# Patient Record
Sex: FEMALE | Race: WHITE | Employment: FULL TIME | URBAN - METROPOLITAN AREA
[De-identification: names, ages, dates, MRNs, and addresses within clinical notes are randomized per-mention and may not be internally consistent; named-entity substitution may affect disease eponyms.]

---

## 2022-06-23 ENCOUNTER — OFFICE VISIT (OUTPATIENT)
Dept: INTERNAL MEDICINE CLINIC | Age: 42
End: 2022-06-23
Payer: COMMERCIAL

## 2022-06-23 VITALS
DIASTOLIC BLOOD PRESSURE: 100 MMHG | WEIGHT: 288 LBS | HEART RATE: 98 BPM | TEMPERATURE: 99.3 F | SYSTOLIC BLOOD PRESSURE: 165 MMHG | BODY MASS INDEX: 45.2 KG/M2 | OXYGEN SATURATION: 96 % | HEIGHT: 67 IN

## 2022-06-23 DIAGNOSIS — Z00.00 LABORATORY EXAM ORDERED AS PART OF ROUTINE GENERAL MEDICAL EXAMINATION: ICD-10-CM

## 2022-06-23 DIAGNOSIS — E78.5 HYPERLIPIDEMIA, UNSPECIFIED HYPERLIPIDEMIA TYPE: ICD-10-CM

## 2022-06-23 DIAGNOSIS — R63.1 POLYDIPSIA: ICD-10-CM

## 2022-06-23 DIAGNOSIS — K62.5 RECTAL BLEEDING: ICD-10-CM

## 2022-06-23 DIAGNOSIS — Z76.89 ENCOUNTER TO ESTABLISH CARE: ICD-10-CM

## 2022-06-23 DIAGNOSIS — R35.89 POLYURIA: ICD-10-CM

## 2022-06-23 DIAGNOSIS — N89.8 VAGINA ITCHING: ICD-10-CM

## 2022-06-23 DIAGNOSIS — Z11.59 NEED FOR HEPATITIS C SCREENING TEST: ICD-10-CM

## 2022-06-23 DIAGNOSIS — Z00.00 PHYSICAL EXAM: Primary | ICD-10-CM

## 2022-06-23 DIAGNOSIS — K59.00 CONSTIPATION, UNSPECIFIED CONSTIPATION TYPE: ICD-10-CM

## 2022-06-23 DIAGNOSIS — I10 ESSENTIAL HYPERTENSION: ICD-10-CM

## 2022-06-23 PROCEDURE — 99386 PREV VISIT NEW AGE 40-64: CPT | Performed by: PHYSICIAN ASSISTANT

## 2022-06-23 PROCEDURE — 99204 OFFICE O/P NEW MOD 45 MIN: CPT | Performed by: PHYSICIAN ASSISTANT

## 2022-06-23 RX ORDER — VALSARTAN 160 MG/1
160 TABLET ORAL DAILY
Qty: 90 TABLET | Refills: 1 | Status: SHIPPED | OUTPATIENT
Start: 2022-06-23

## 2022-06-23 NOTE — PROGRESS NOTES
Chief Complaint   Patient presents with    Establish Care     Visit Vitals  BP (!) 165/100 (BP 1 Location: Right arm, BP Patient Position: Sitting, BP Cuff Size: Large adult)   Pulse 98   Temp 99.3 °F (37.4 °C) (Oral)   Ht 5' 7\" (1.702 m)   Wt 288 lb (130.6 kg)   SpO2 96%   BMI 45.11 kg/m²       1. \"Have you been to the ER, urgent care clinic since your last visit? Hospitalized since your last visit? \" No    2. \"Have you seen or consulted any other health care providers outside of the [address] [place] [address] since your last visit? \" No     3. For patients aged 39-70: Has the patient had a colonoscopy / FIT/ Cologuard? No      If the patient is female:    4. For patients aged 41-77: Has the patient had a mammogram within the past 2 years? No      5. For patients aged 21-65: Has the patient had a pap smear?  Yes - no Care Gap present done in 2020

## 2022-06-23 NOTE — PATIENT INSTRUCTIONS
Well Visit, Ages 25 to 48: Care Instructions  Overview     Well visits can help you stay healthy. Your doctor has checked your overall health and may have suggested ways to take good care of yourself. Your doctor also may have recommended tests. At home, you can help prevent illness with healthy eating, regular exercise, and other steps. Follow-up care is a key part of your treatment and safety. Be sure to make and go to all appointments, and call your doctor if you are having problems. It's also a good idea to know your test results and keep a list of the medicines you take. How can you care for yourself at home? · Get screening tests that you and your doctor decide on. Screening helps find diseases before any symptoms appear. · Eat healthy foods. Choose fruits, vegetables, whole grains, protein, and low-fat dairy foods. Limit fat, especially saturated fat. Reduce salt in your diet. · Limit alcohol. If you are a man, have no more than 2 drinks a day or 14 drinks a week. If you are a woman, have no more than 1 drink a day or 7 drinks a week. · Get at least 30 minutes of physical activity on most days of the week. Walking is a good choice. You also may want to do other activities, such as running, swimming, cycling, or playing tennis or team sports. Discuss any changes in your exercise program with your doctor. · Reach and stay at a healthy weight. This will lower your risk for many problems, such as obesity, diabetes, heart disease, and high blood pressure. · Do not smoke or allow others to smoke around you. If you need help quitting, talk to your doctor about stop-smoking programs and medicines. These can increase your chances of quitting for good. · Care for your mental health. It is easy to get weighed down by worry and stress. Learn strategies to manage stress, like deep breathing and mindfulness, and stay connected with your family and community.  If you find you often feel sad or hopeless, talk with your doctor. Treatment can help. · Talk to your doctor about whether you have any risk factors for sexually transmitted infections (STIs). You can help prevent STIs if you wait to have sex with a new partner (or partners) until you've each been tested for STIs. It also helps if you use condoms (male or female condoms) and if you limit your sex partners to one person who only has sex with you. Vaccines are available for some STIs, such as HPV. · Use birth control if it's important to you to prevent pregnancy. Talk with your doctor about the choices available and what might be best for you. · If you think you may have a problem with alcohol or drug use, talk to your doctor. This includes prescription medicines (such as amphetamines and opioids) and illegal drugs (such as cocaine and methamphetamine). Your doctor can help you figure out what type of treatment is best for you. · Protect your skin from too much sun. When you're outdoors from 10 a.m. to 4 p.m., stay in the shade or cover up with clothing and a hat with a wide brim. Wear sunglasses that block UV rays. Even when it's cloudy, put broad-spectrum sunscreen (SPF 30 or higher) on any exposed skin. · See a dentist one or two times a year for checkups and to have your teeth cleaned. · Wear a seat belt in the car. When should you call for help? Watch closely for changes in your health, and be sure to contact your doctor if you have any problems or symptoms that concern you. Where can you learn more? Go to http://www.[employer].com/  Enter P072 in the search box to learn more about \"Well Visit, Ages 25 to 48: Care Instructions. \"  Current as of: October 6, 2021               Content Version: 13.2  © 3394-1474 [employer], [employer]. Care instructions adapted under license by "[employer] (which disclaims liability or warranty for this information).  If you have questions about a medical condition or this instruction, always ask your healthcare professional. [place] [address] any warranty or liability for your use of this information.

## 2022-06-23 NOTE — PROGRESS NOTES
HPI:  39 y.o.  presents as new patient for wellness visit and to establish care. Her mother is also a patient of mine.      She previously saw [facility], but not since 2019  Born in [place], lived in [place] 2007 - 2015  She left [facility] in 2015, moved to [facility], and returned in 2017  She is  March 2020, no children  She works for a [employer]    S/p T&A 2019, and then has not seen regular doctor since the Covid pandemic    Diagnosed with VILMA while living in [facility] in 2016, started CPAP there, but developed recurring tonsiitis upon her return to the [facility], followed by ENT  She also had uvula removed and benign growth was on tonsil    Pap smear by Gyn in 2020  At [facility], Dr Akbar New  Had an odd, heavy period at that time 8/2020 and saw gyn 9/2020 and had negative US    H/O HTN and Hyperlipidemia  Reports her HTN and high CHL resolved when she lost weight, 100 lbs, in 2011  Does not recall names of meds she was on  Reports when on meds, her BP ran in the 140s    She last had labs done in 2018    No prior h/o DM, prediabetes  But she has felt thirsty, dehydrated, started drinking water with subsequent increased urination  No blurry vision  Her PGF had diabetes    No CP, ASHRAF    Reports changes in stool habits  Became constipated 4 months ago, normal daily BM for her but went 3-4 days without a BM,  had bright red blood once she had a BM after 3-4 days , and may occur intermittently since then  No change in diet or stress level at the time  No fam hx colon cancer  Her father has had benign colon polyps    During the last 4 mos she treated a vaginal dryness and itch with 3 different rounds of OTC yeast med    She has lost 20 lbs this year by her scale    Had Tdap in 2014 for trip to [place]    She took a weight loss medicine in 2017, a \"purple pill\" that made her dizzy and lightheaded      Patient Active Problem List    Diagnosis    Sleep apnea     diagnosed in [facility] 2016, repeat study after tonstilectomy showed improvement      Hypertension     came off meds in 2011 after losing 100 lbs      Hyperlipemia     came off meds in 2011 after losing 100 lbs           Past Medical History:   Diagnosis Date    Sleep apnea        Social History     Tobacco Use    Smoking status: Not on file    Smokeless tobacco: Never Used   Vaping Use    Vaping Use: Not on file   Substance Use Topics    Alcohol use: Yes     Comment: occasionally    Drug use: Never           No Known Allergies    ROS:  Review of Systems:  Gen: no fatigue, fever, chills, (+)intentional weight loss; no weight gain  Eyes: no excessive tearing, itching, or discharge  Nose: no rhinorrhea, no sinus pain  Mouth: no oral lesions, no sore throat  Resp: no shortness of breath, no wheezing, no cough  CV: no chest pain, no orthopnea, no paroxysmal nocturnal dyspnea, no lower extremity edema, no palpitations  Abd: (+)constipation, rectal bleeding; no nausea, no heartburn, no diarrhea, no abdominal pain  Neuro: no headaches, no syncope or presyncopal episodes  Endo: (+)polyuria, polydipsia  Heme: no lymphadenopathy, no easy bruising or bleeding      PE:  Visit Vitals  BP (!) 165/100 (BP 1 Location: Right arm, BP Patient Position: Sitting, BP Cuff Size: Large adult)   Pulse 98   Temp 99.3 °F (37.4 °C) (Oral)   Ht 5' 7\" (1.702 m)   Wt 288 lb (130.6 kg)   SpO2 96%   BMI 45.11 kg/m²     Gen: alert, oriented, no acute distress  Head: normocephalic, atraumatic  Ears: external auditory canals clear, TMs without erythema or effusion  Eyes: pupils equal round reactive to light, sclera clear, conjunctiva clear  Oral: moist mucus membranes, no oral lesions, no pharyngeal inflammation or exudate  Neck: symmetric normal sized thyroid, no carotid bruits, no jugular vein distention  Resp: no increase work of breathing, lungs clear to ausculation bilaterally, no wheezing, rales or rhonchi  CV: S1, S2 normal.  No murmurs, rubs, or gallops.     Abd: soft, not tender, not distended. No hepatosplenomegaly. Normal bowel sounds. Neuro: cranial nerves intact, normal strength and movement in all extremities, reflexes and sensation intact and symmetric. Skin: no lesion or rash  Extremities: no cyanosis or edema  Breast and pelvic deferred to Gyn    No results found for this visit on 06/23/22. Assessment/Plan:      ICD-10-CM ICD-9-CM    1. Physical exam  Z00.00 V70.9    2. Encounter to establish care  Z76.89 V65.8    3. Need for hepatitis C screening test  Z11.59 V73.89 HEPATITIS C AB   4. Laboratory exam ordered as part of routine general medical examination  C63.24 D00.25 METABOLIC PANEL, COMPREHENSIVE      CBC WITH AUTOMATED DIFF      HEMOGLOBIN A1C WITH EAG      LIPID PANEL      URINALYSIS W/ REFLEX CULTURE      TSH 3RD GENERATION      HEPATITIS C AB   5. Essential hypertension  I10 401.9 valsartan (DIOVAN) 160 mg tablet   6. Hyperlipidemia, unspecified hyperlipidemia type  E78.5 272.4 LIPID PANEL   7. Rectal bleeding  K62.5 569.3 OCCULT BLOOD IMMUNOASSAY,DIAGNOSTIC      REFERRAL TO GASTROENTEROLOGY   8. Constipation, unspecified constipation type  K59.00 564.00 REFERRAL TO GASTROENTEROLOGY   9. Vagina itching  N89.8 698.1 HEMOGLOBIN A1C WITH EAG      NUSWAB VAGINITIS PLUS (VG+) WITH CANDIDA (SIX SPECIES)   10. Polydipsia  R63.1 783.5 HEMOGLOBIN A1C WITH EAG   11. Polyuria  R35.89 788.42 HEMOGLOBIN A1C WITH EAG      URINALYSIS W/ REFLEX CULTURE       1. Annual physical exam  Pap and pelvic per GYN, will request records from Dr. Sandeep Lugo    2. Establishing care today    3. Routine hepatitis C screening    4. Routine labs ordered    5. Hypertension  History of same but came off medications with weight loss, has not been treated since 2011  Not controlled, blood pressure 165/100 today  New start valsartan 160 mg    6.   Hyperlipidemia  History of same but came off medication with weight loss and has not been treated since 2011  We will check fasting lipids to make further recommendations    7. Rectal bleeding  Has occurred in the setting of constipation over the last 4 months  No family history of colon cancer, but her father has colon polyps  We will check Hemoccult and also refer to gastroenterology for further recommendation  See #8    8. Constipation  Recommended stations given for diet and lifestyle  With changes in bowel habits and associated with rectal bleeding will refer to GI    9. Vaginal itching  She is done over-the-counter yeast treatment x3 without relief  See below, this could be sign of uncontrolled or new onset diabetes  Will check UA, new swab and diabetes screening    10/11. Polydipsia and polyuria  Concern for new onset diabetes, will check labs and make further recommendations  Will also monitor urine for UTI      Health Maintenance reviewed - updated.     Orders Placed This Encounter    METABOLIC PANEL, COMPREHENSIVE     Standing Status:   Future     Number of Occurrences:   1     Standing Expiration Date:   6/23/2023    CBC WITH AUTOMATED DIFF     Standing Status:   Future     Number of Occurrences:   1     Standing Expiration Date:   6/23/2023    HEMOGLOBIN A1C WITH EAG     Standing Status:   Future     Number of Occurrences:   1     Standing Expiration Date:   6/23/2023    LIPID PANEL     Standing Status:   Future     Number of Occurrences:   1     Standing Expiration Date:   6/23/2023    URINALYSIS W/ REFLEX CULTURE     Standing Status:   Future     Number of Occurrences:   1     Standing Expiration Date:   6/23/2023    TSH 3RD GENERATION     Standing Status:   Future     Number of Occurrences:   1     Standing Expiration Date:   6/23/2023    HEPATITIS C AB     Standing Status:   Future     Number of Occurrences:   1     Standing Expiration Date:   6/23/2023    OCCULT BLOOD IMMUNOASSAY,DIAGNOSTIC     Standing Status:   Future     Standing Expiration Date:   6/23/2023    NUSWAB VAGINITIS PLUS (VG+) WITH CANDIDA (SIX SPECIES)     Standing Status:   Future     Number of Occurrences:   1     Standing Expiration Date:   6/23/2023    [facility]     Referral Priority:   Routine     Referral Type:   Consultation     Referral Reason:   Specialty Services Required     Referred to Provider:   Tiffany Kwong MD     Number of Visits Requested:   1    valsartan (DIOVAN) 160 mg tablet     Sig: Take 1 Tablet by mouth daily. Dispense:  90 Tablet     Refill:  1       There are no discontinued medications. Recommended healthy diet low in carbohydrates, fats, sodium and cholesterol. Recommended regular cardiovascular exercise 3-6 times per week for 30-60 minutes daily. Verbal and written instructions (see AVS) provided. Patient expresses understanding of diagnosis and treatment plan. Follow-up and Dispositions    · Return in about 4 weeks (around 7/21/2022) for HTN, fasting labs soon.        Future Appointments   Date Time Provider Joseph Gonzalez   8/11/2022  8:00 AM Genoveva Hernández PA-C [facility]

## 2022-06-24 ENCOUNTER — APPOINTMENT (OUTPATIENT)
Dept: INTERNAL MEDICINE CLINIC | Age: 42
End: 2022-06-24

## 2022-06-24 DIAGNOSIS — E78.5 HYPERLIPIDEMIA, UNSPECIFIED HYPERLIPIDEMIA TYPE: ICD-10-CM

## 2022-06-24 DIAGNOSIS — R35.89 POLYURIA: ICD-10-CM

## 2022-06-24 DIAGNOSIS — Z11.59 NEED FOR HEPATITIS C SCREENING TEST: ICD-10-CM

## 2022-06-24 DIAGNOSIS — R63.1 POLYDIPSIA: ICD-10-CM

## 2022-06-24 DIAGNOSIS — N89.8 VAGINA ITCHING: ICD-10-CM

## 2022-06-24 DIAGNOSIS — Z00.00 LABORATORY EXAM ORDERED AS PART OF ROUTINE GENERAL MEDICAL EXAMINATION: ICD-10-CM

## 2022-06-24 LAB
LDLC SERPL DIRECT ASSAY-MCNC: 189 MG/DL (ref 0–100)
UR CULT HOLD, URHOLD: NORMAL

## 2022-06-25 LAB
ALBUMIN SERPL-MCNC: 4.1 G/DL (ref 3.5–5)
ALBUMIN/GLOB SERPL: 1.1 {RATIO} (ref 1.1–2.2)
ALP SERPL-CCNC: 135 U/L (ref 45–117)
ALT SERPL-CCNC: 197 U/L (ref 12–78)
ANION GAP SERPL CALC-SCNC: 8 MMOL/L (ref 5–15)
APPEARANCE UR: ABNORMAL
AST SERPL-CCNC: 121 U/L (ref 15–37)
BACTERIA SPEC CULT: NORMAL
BACTERIA URNS QL MICRO: ABNORMAL /HPF
BASOPHILS # BLD: 0 K/UL (ref 0–0.1)
BASOPHILS NFR BLD: 1 % (ref 0–1)
BILIRUB SERPL-MCNC: 0.4 MG/DL (ref 0.2–1)
BILIRUB UR QL: NEGATIVE
BUN SERPL-MCNC: 10 MG/DL (ref 6–20)
BUN/CREAT SERPL: 18 (ref 12–20)
CALCIUM SERPL-MCNC: 10.3 MG/DL (ref 8.5–10.1)
CHLORIDE SERPL-SCNC: 101 MMOL/L (ref 97–108)
CHOLEST SERPL-MCNC: 325 MG/DL
CO2 SERPL-SCNC: 27 MMOL/L (ref 21–32)
COLOR UR: ABNORMAL
CREAT SERPL-MCNC: 0.55 MG/DL (ref 0.55–1.02)
DIFFERENTIAL METHOD BLD: ABNORMAL
EOSINOPHIL # BLD: 0.1 K/UL (ref 0–0.4)
EOSINOPHIL NFR BLD: 2 % (ref 0–7)
EPITH CASTS URNS QL MICRO: ABNORMAL /LPF
ERYTHROCYTE [DISTWIDTH] IN BLOOD BY AUTOMATED COUNT: 13.3 % (ref 11.5–14.5)
EST. AVERAGE GLUCOSE BLD GHB EST-MCNC: 258 MG/DL
GLOBULIN SER CALC-MCNC: 3.6 G/DL (ref 2–4)
GLUCOSE SERPL-MCNC: 250 MG/DL (ref 65–100)
GLUCOSE UR STRIP.AUTO-MCNC: >1000 MG/DL
HBA1C MFR BLD: 10.6 % (ref 4–5.6)
HCT VFR BLD AUTO: 47.6 % (ref 35–47)
HCV AB SERPL QL IA: NONREACTIVE
HDLC SERPL-MCNC: 42 MG/DL
HDLC SERPL: 7.7 {RATIO} (ref 0–5)
HGB BLD-MCNC: 16.1 G/DL (ref 11.5–16)
HGB UR QL STRIP: ABNORMAL
IMM GRANULOCYTES # BLD AUTO: 0 K/UL (ref 0–0.04)
IMM GRANULOCYTES NFR BLD AUTO: 1 % (ref 0–0.5)
KETONES UR QL STRIP.AUTO: ABNORMAL MG/DL
LDLC SERPL CALC-MCNC: ABNORMAL MG/DL (ref 0–100)
LEUKOCYTE ESTERASE UR QL STRIP.AUTO: ABNORMAL
LYMPHOCYTES # BLD: 2.4 K/UL (ref 0.8–3.5)
LYMPHOCYTES NFR BLD: 30 % (ref 12–49)
MCH RBC QN AUTO: 29.3 PG (ref 26–34)
MCHC RBC AUTO-ENTMCNC: 33.8 G/DL (ref 30–36.5)
MCV RBC AUTO: 86.5 FL (ref 80–99)
MONOCYTES # BLD: 0.4 K/UL (ref 0–1)
MONOCYTES NFR BLD: 5 % (ref 5–13)
NEUTS SEG # BLD: 5 K/UL (ref 1.8–8)
NEUTS SEG NFR BLD: 61 % (ref 32–75)
NITRITE UR QL STRIP.AUTO: NEGATIVE
NRBC # BLD: 0 K/UL (ref 0–0.01)
NRBC BLD-RTO: 0 PER 100 WBC
PH UR STRIP: 5.5 [PH] (ref 5–8)
PLATELET # BLD AUTO: 367 K/UL (ref 150–400)
PMV BLD AUTO: 9.1 FL (ref 8.9–12.9)
POTASSIUM SERPL-SCNC: 4.3 MMOL/L (ref 3.5–5.1)
PROT SERPL-MCNC: 7.7 G/DL (ref 6.4–8.2)
PROT UR STRIP-MCNC: 300 MG/DL
RBC # BLD AUTO: 5.5 M/UL (ref 3.8–5.2)
RBC #/AREA URNS HPF: ABNORMAL /HPF (ref 0–5)
SERVICE CMNT-IMP: NORMAL
SODIUM SERPL-SCNC: 136 MMOL/L (ref 136–145)
SP GR UR REFRACTOMETRY: >1.03
TRIGL SERPL-MCNC: 607 MG/DL (ref ?–150)
TSH SERPL DL<=0.05 MIU/L-ACNC: 3 UIU/ML (ref 0.36–3.74)
UA: UC IF INDICATED,UAUC: ABNORMAL
UROBILINOGEN UR QL STRIP.AUTO: 0.2 EU/DL (ref 0.2–1)
VLDLC SERPL CALC-MCNC: ABNORMAL MG/DL
WBC # BLD AUTO: 8.1 K/UL (ref 3.6–11)
WBC URNS QL MICRO: ABNORMAL /HPF (ref 0–4)
YEAST BUDDING URNS QL: PRESENT
YEAST URNS QL MICRO: PRESENT

## 2022-06-27 ENCOUNTER — TELEPHONE (OUTPATIENT)
Dept: INTERNAL MEDICINE CLINIC | Age: 42
End: 2022-06-27

## 2022-06-27 DIAGNOSIS — B37.31 YEAST VAGINITIS: ICD-10-CM

## 2022-06-27 DIAGNOSIS — E11.9 NEW ONSET TYPE 2 DIABETES MELLITUS (HCC): Primary | ICD-10-CM

## 2022-06-27 RX ORDER — METFORMIN HYDROCHLORIDE 500 MG/1
TABLET, EXTENDED RELEASE ORAL
Qty: 180 TABLET | Refills: 1 | Status: SHIPPED | OUTPATIENT
Start: 2022-06-27

## 2022-06-27 RX ORDER — TERCONAZOLE 4 MG/G
1 CREAM VAGINAL
Qty: 45 G | Refills: 0 | Status: SHIPPED | OUTPATIENT
Start: 2022-06-27

## 2022-06-27 RX ORDER — LANCETS
EACH MISCELLANEOUS
Qty: 100 EACH | Refills: 3 | Status: SHIPPED | OUTPATIENT
Start: 2022-06-27

## 2022-06-27 RX ORDER — INSULIN PUMP SYRINGE, 3 ML
EACH MISCELLANEOUS
Qty: 1 KIT | Refills: 0 | Status: SHIPPED | OUTPATIENT
Start: 2022-06-27 | End: 2022-07-25

## 2022-06-27 RX ORDER — GLIMEPIRIDE 2 MG/1
TABLET ORAL
Qty: 180 TABLET | Refills: 1 | Status: SHIPPED | OUTPATIENT
Start: 2022-06-27

## 2022-06-27 NOTE — TELEPHONE ENCOUNTER
I spoke with patient about lab results    1. New onset diabetes  A1c 10.6%  New start metformin  mg 1 pill at dinner x1 week, then increase to 2 pills and stay at that dose  New start glimepiride 2 mg with breakfast x1 week, then increase to 4 mg and stay at that dose  Risk benefits side effects of medications reviewed  Will send in prescription for glucometer, test strips, lancets  Refer to [facility]    2.  Yeast infection  Will send in Terazol 7    3. Elevated liver enzymes  Not uncommon in setting of new onset diabetes  Hepatitis C virus antibody negative  We will check hepatitis B antibodies with next labs  Most likely fatty liver    4. Elevated cholesterol and triglycerides  Expect this to improve with diabetes control  Reviewed standard of care for statin in diabetic patients and goal LDL  So as not to start too many medications at once, we will add statin at a future visit    5.   We will need microalbumin checked with next labs next    (A1C, microalbumin, CMP, HBV panel)

## 2022-06-28 LAB
A VAGINAE DNA VAG QL NAA+PROBE: ABNORMAL SCORE
BVAB2 DNA VAG QL NAA+PROBE: ABNORMAL SCORE
C ALBICANS DNA VAG QL NAA+PROBE: POSITIVE
C GLABRATA DNA VAG QL NAA+PROBE: NEGATIVE
C PARAPSILOSIS/TROPICALIS: NEGATIVE
C TRACH RRNA SPEC QL NAA+PROBE: NEGATIVE
CANDIDA KRUSEI, NAA, 180016: NEGATIVE
CANDIDA LUSITANIAE, NAA, 180015: NEGATIVE
MEGA1 DNA VAG QL NAA+PROBE: ABNORMAL SCORE
N GONORRHOEA RRNA SPEC QL NAA+PROBE: NEGATIVE
T VAGINALIS RRNA SPEC QL NAA+PROBE: NEGATIVE

## 2022-07-11 ENCOUNTER — VIRTUAL VISIT (OUTPATIENT)
Dept: DIABETES SERVICES | Age: 42
End: 2022-07-11
Payer: COMMERCIAL

## 2022-07-11 DIAGNOSIS — E11.9 NEW ONSET TYPE 2 DIABETES MELLITUS (HCC): ICD-10-CM

## 2022-07-11 PROCEDURE — G0108 DIAB MANAGE TRN  PER INDIV: HCPCS

## 2022-07-15 NOTE — PROGRESS NOTES
[facility]  Diabetes Self-Management Education & Support Program    Reason for Referral: DM 2  Referral Source: Leah Pardo  Services requested: DSMES       ASSESSMENT    From my perspective, the participant would benefit from [facility] specifically related to reducing risks, healthy eating, monitoring, taking medications, physical activity, healthy coping and problem solving. Will adapt DSMES program to build on participant's skills score, confidence score and preparedness score as noted in the Diabetes Skills, Confidence, and Preparedness Index. During the program, we will focus on providing DSMES that specifically addresses participant's interest in reducing risks, healthy eating, monitoring, taking medications, physical activity, healthy coping and problem solving, as shown by their reported readiness to change. The participant would be best served by attending weekly individual sessions. Diabetes Self-Management Education Follow-up Visit: August 12, 2022       Clinical Presentation  Lorna Connolly is a 39 y.o. White female referred for diabetes self-management education. Participant has Type 2 DM not on insulin for <1 year. Family history positivefor diabetes. Patient reports not receiving DSMES services in the past. Most recent A1c value:   Lab Results   Component Value Date/Time    Hemoglobin A1c 10.6 (H) 06/24/2022 10:37 AM       Diabetes-related medications:  Current dosing:   Key Antihyperglycemic Medications             metFORMIN ER (GLUCOPHAGE XR) 500 mg tablet Take 1 tab po with dinner x 1 wk, then increase to 2 tabs po with dinner and stay at this dose    glimepiride (AMARYL) 2 mg tablet Take 1 tab po with breakfast x 1 wk, then increase to 2 tabs po with breakfast and stay at this dose          Blood Pressure Management  Key ACE/ARB Medications             valsartan (DIOVAN) 160 mg tablet Take 1 Tablet by mouth daily.           Lipid Management  Key Antihyperlipidemia Meds     The patient is on no antihyperlipidemia meds. Clot Prevention  Key Anti-Platelet Anticoagulant Meds     The patient is on no antiplatelet meds or anticoagulants. Learning Assessment  Learning objectives Educator assessment (7/15/2022)   Diabetes Disease Process  The participant can   A) describe diabetes in basic terms;   B) state the type of diabetes they have; &   C) state accepted blood glucose targets. Healthy Eating  The participant can   A) identify carbohydrate foods; &   B) accurately read food labels. Being Active  The participant can  A) state the benefits of physical activity;  B) report their current PA practices;  C) identify PA they would consider incorporating in their lives; &  D) develop an implementation plan. Monitoring  The participant can  A) operate their blood glucose meter; &  B) describe how they log their blood glucoses to share with their provider. Taking Medications  The participant can  A) name their diabetes medications;  B) state the purpose and dose;  C) note side effects; &  D) describe proper storage, disposal & transport (if appropriate). Healthy Coping  The participant can    A) describe their response to diabetes diagnosis; B) describe their specific coping mechanisms;  C) identify supportive people and/or other resources that positively support their diabetes self-care and health. Reducing Risks  The participant can describe the preventive measures used by providers to promote health and prevent diabetes complications. Problem Solving  The participant can   A) identify signs, symptoms & treatment of hypoglycemia;    B) identify signs, symptoms & treatment of hyperglycemia;  C) describe their sick day plan; &  D) identify BG patterns to discuss with their provider.        Yes  Yes  No        Yes, sugar, fruits, grains  No        Yes  Yes, works in yard  Yes, crossfit, walking  Yes        Yes  No        Yes  No  Yes, loose stools  Yes        Yes  Yes, plans ahead  Yes,         No          No  No  No  No     Characteristics to Learning   Barriers to Learning      None     Favorite Ways to Learn   [] Lecture  [] Slides  [] Reading [] Video-Internet  [] Cassettes/CDs/MP3's  [] Interactive Small Groups [x] Other one on one     Behavioral Assessment  Current self-care practices  Educator assessment (7/15/2022)   Healthy Eating   Current practices    24-hour Dietary Recall:  Breakfast: 2 egg bites, cottage cheese with spinach, onions, peppers, 2-3 ounces sausage, black coffee, water  Lunch: 2-4 ounces turkey or low fat ham, cheese, water  Dinner: Large salad with cucumbers, peas, tomatoes, corn, grilled or baked zucchini lasagna, seltzer water  Snacks: tuna fish, carrots, cucumbers with hummus  Beverages: water, seltzer water, unsweetened tea, crystal lite  Alcohol: glass of wine or a beer once a month       Would benefit from [facility] related to Healthy Eating: Yes    Eats a carbohydrate controlled diet: No    Stage of change: Action      Being Active  Current practices  How many days during the past week have you performed physical activity where your heart beats faster and your breathing is harder than normal for 30 minutes or more?  0 day(s)    How many days in a typical week do you perform activity such as this?  0 day(s)     Would benefit from [facility] related to Being Active: Yes}      Exercises 150 minutes/week: No      Stage of change: Action     Monitoring  Current practices  Do you monitor your blood sugar? Yes    How often do you monitor? < 1x/day    What are the range of readings? Checks 3-4 times a week FBS    250 mg/dL -  260 mg/dL      Do you know your last A1c measurement? Yes    Do you know the meaning of the A1c?  Yes     Would benefit from [facility] related to Monitoring: Yes      Uses BG readings to establish trends and understand BG patterns: No      Stage of change: Action       Taking Medication  Current practices  Do you understand what your diabetes medications do? No    How often do you miss doses of your diabetes medications? never    Can you afford your diabetes medications? Yes   Would benefit from [facility] related to Taking Medication: Yes      Takes medications consistently to receive full benefit: Yes      Stage of change: Action       Healthy Coping   Current state  Diabetes Skills, Confidence and Preparedness Index: Total score: 4.9  Skills: 3.4  Confidence: 5.7  Preparedness: 6.0       Would benefit from DSMES related to Healthy Coping: Yes      Identifies specific people, organizations,etc, that actively support their diabetes self-care efforts: Yes,       Stage of change: Action     Reducing Risks  Current state  Vaccines:  Influenza: 2021    Pneumococcal: No     Hepatitis: States received in high school    Examinations:  Diabetic Foot and Eye Exam HM Status   Topic Date Due    Diabetic Foot Care  Never done    Eye Exam  Due        Dental exam: 6 months ago    Foot exam: Due    Heart Protection:  BP Readings from Last 2 Encounters:   06/23/22 (!) 165/100        Lab Results   Component Value Date/Time    LDL,Direct 189 (H) 06/24/2022 10:37 AM    LDL, calculated Not calculated due to elevated triglyceride level 06/24/2022 10:37 AM        Kidney Protection:  No results found for: MCACR, MCA1, MCA2, MCA3, MCAU, MCAU2, MCALPOCT     Would benefit from [facility] related to Reducing Risks:  Yes      Actively participates in decision-making with provider regarding secondary prevention:  No      Stage of change: Preparation   Problem Solving  Current state  Hypoglycemia Management:  What are signs and symptoms of hypoglycemia that you experience: seizure    How do you prevent hypoglycemia: consistent meals/snack time and monitoring BG    How do you treat hypoglycemia: Patient is unaware of how to treat hypoglycemia    Hyperglycemia Management:  What are signs and symptoms of hyperglycemia that you experience: Extreme thirst, Frequent urination    How can you prevent hyperglycemia: engage in regular physical activity    Sick Day Management:  What do you do differently on sick days:  Pt reported being unaware of self-management on sick days    Pattern Management:  Do you notice blood glucose patterns when you look at the readings in your meter or logbook? No    How do you use the blood glucose readings from your meter or logbook? understand how body responds to meals     Would benefit from [facility] related to Problem Solving: Yes      Articulates appropriate strategies to address hypoglycemia, hyperglycemia, sick day care and BG pattern: No      Stage of change: Action       Note: Content derived from the American Association of Diabetes Educators' Diabetes Education Curriculum: A Guide to Successful Self-Management (3rd edition)      Achille Duverney, RN on 7/15/2022 at 1:55 PM    I have personally reviewed the health record, including provider notes, laboratory data and current medications before making these care and education recommendations. The time spent in this effort is included in the total time. Total minutes: 39    [id number] [facility] Emergency Adaptations for Telehealth:  Suzanna Díaz, was evaluated through a synchronous (real-time) audio-video encounter. The patient (or guardian if applicable) is aware that this is a billable service, which includes applicable co-pays. This Virtual Visit was conducted with patient's (and/or legal guardian's) consent. The visit was conducted pursuant to the emergency declaration under the [address] [place] [address], [address] [place] [address] authority and the [employer] Act. Patient identification was verified, and a caregiver was present when appropriate. The patient was located in a state where the provider was licensed to provide care. Patient : In home  Provider:  In facility    Overall SCPI score: 4.9 Skills Score: 3.4  Low: Healthy Eating(Q1),Blood Sugar Monitoring(Q4),Reducing Risks(Q5),Problem Solving(Q6),Healthy Coping(Q7),Blood Sugar Monitoring(Q8) Confidence Score: 5.7  Low: Healthy Coping(Q2) Preparedness Score: 6.0  Low: Blood Sugar Monitoring(Q6)  Healthy Eating Score: 5.3  Low: Skills(Q1) Taking Medication Score: 7.0  Low: Skills(Q2) Blood Sugar Monitoring Score: 4.0  Low: Skills(Q4),Skills(Q8) Reducing Risks Score: 5.0  Low: Skills(Q5)  Problem Solving Score: 4.7  Low: Skills(Q6) Healthy Coping Score: 4.0  Low: KWROVD(H3) Being Active Score: 6.5  Low: Confidence(Q5)    Skills/Knowledge Questions  1. I know how to plan meals that have the best balance between carbohydrates, proteins and vegetables. 2  2. I know how my diabetes medications (pills, injectables and/or insulin) work in my body. 7  3. I know when to check my blood sugar if I want to see how my body responded to a meal. 6  4. I know when to check my blood sugars to determine if my medication or insulin doses are correct. 2  5. I know what to do to prevent a low blood sugar when I exercise (either before, during, or after). 2  6. When I am sick, I know what to do differently with my diabetes management. 2  7. I know how stress can affect my diabetes management. 2  8. When I look at my blood sugars over a given week, I can explain what my blood sugar pattern is. 2  9. I know what my target levels are for A1c, blood pressure and cholesterol. 6  Confidence Questions  1. I am confident that I can plan balanced meals and snacks. 6  2. I am confident that I can manage my stress. 4  3. I am confident that I can prevent a low blood sugar during or after exercise. 6  4. I am confident that the next time I eat out, I will be able to choose foods that best keep my blood sugars in target. 6  5. I am confident I can include exercise into my schedule. 6  6.  I am confident that I can use my daily blood sugars to adjust my diet, my activity, and/or my insulin. 6  7. When something out of my normal routine happens, I am confident that I can problem-solve and keep my diabetes on track. 6  Preparedness Questions  1. Within the next month, I will begin to eat more balanced meals and snacks. 8  2. Within the next month, I will choose an exercise activity and I will start fitting it into my schedule. 8  3. Within the next month, I will make a list of stress management options that work for me. 6  4. Within the next month, I will consistently plan ahead to prevent low blood sugars. 6  5. Within the next month, I will start adjusting my insulin doses on my own. 0  6. Within the next month, I will begin making changes to my diabetes management based on my daily blood sugars (eg - eating, activity and/or insulin). 4  7. Within the next month, I will begin making changes to my diabetes management to meet my overall goals (eg - eating, activity and/or insulin).  6

## 2022-07-24 DIAGNOSIS — E11.9 NEW ONSET TYPE 2 DIABETES MELLITUS (HCC): ICD-10-CM

## 2022-07-25 RX ORDER — BLOOD-GLUCOSE METER
EACH MISCELLANEOUS
Qty: 1 EACH | Refills: 0 | Status: SHIPPED | OUTPATIENT
Start: 2022-07-25

## 2022-08-11 ENCOUNTER — OFFICE VISIT (OUTPATIENT)
Dept: INTERNAL MEDICINE CLINIC | Age: 42
End: 2022-08-11
Payer: COMMERCIAL

## 2022-08-11 VITALS
HEIGHT: 67 IN | WEIGHT: 292 LBS | DIASTOLIC BLOOD PRESSURE: 98 MMHG | HEART RATE: 92 BPM | RESPIRATION RATE: 18 BRPM | OXYGEN SATURATION: 98 % | SYSTOLIC BLOOD PRESSURE: 176 MMHG | BODY MASS INDEX: 45.83 KG/M2 | TEMPERATURE: 99.4 F

## 2022-08-11 DIAGNOSIS — E11.9 NEW ONSET TYPE 2 DIABETES MELLITUS (HCC): Primary | ICD-10-CM

## 2022-08-11 DIAGNOSIS — I10 ESSENTIAL HYPERTENSION: ICD-10-CM

## 2022-08-11 DIAGNOSIS — E78.2 HYPERCHOLESTEROLEMIA WITH HYPERTRIGLYCERIDEMIA: ICD-10-CM

## 2022-08-11 DIAGNOSIS — K62.5 RECTAL BLEEDING: ICD-10-CM

## 2022-08-11 DIAGNOSIS — K59.00 CONSTIPATION, UNSPECIFIED CONSTIPATION TYPE: ICD-10-CM

## 2022-08-11 LAB
ANION GAP SERPL CALC-SCNC: 8 MMOL/L (ref 5–15)
BUN SERPL-MCNC: 13 MG/DL (ref 6–20)
BUN/CREAT SERPL: 25 (ref 12–20)
CALCIUM SERPL-MCNC: 9.9 MG/DL (ref 8.5–10.1)
CHLORIDE SERPL-SCNC: 104 MMOL/L (ref 97–108)
CO2 SERPL-SCNC: 28 MMOL/L (ref 21–32)
CREAT SERPL-MCNC: 0.53 MG/DL (ref 0.55–1.02)
CREAT UR-MCNC: 59.4 MG/DL
EST. AVERAGE GLUCOSE BLD GHB EST-MCNC: 192 MG/DL
GLUCOSE SERPL-MCNC: 146 MG/DL (ref 65–100)
HBA1C MFR BLD: 8.3 % (ref 4–5.6)
MICROALBUMIN UR-MCNC: 32.7 MG/DL
MICROALBUMIN/CREAT UR-RTO: 551 MG/G (ref 0–30)
POTASSIUM SERPL-SCNC: 4.1 MMOL/L (ref 3.5–5.1)
SODIUM SERPL-SCNC: 140 MMOL/L (ref 136–145)

## 2022-08-11 PROCEDURE — 99214 OFFICE O/P EST MOD 30 MIN: CPT | Performed by: PHYSICIAN ASSISTANT

## 2022-08-11 PROCEDURE — 3046F HEMOGLOBIN A1C LEVEL >9.0%: CPT | Performed by: PHYSICIAN ASSISTANT

## 2022-08-11 RX ORDER — HYDROCHLOROTHIAZIDE 25 MG/1
25 TABLET ORAL DAILY
Qty: 30 TABLET | Refills: 5 | Status: SHIPPED | OUTPATIENT
Start: 2022-08-11

## 2022-08-11 RX ORDER — ATORVASTATIN CALCIUM 40 MG/1
40 TABLET, FILM COATED ORAL DAILY
Qty: 90 TABLET | Refills: 1 | Status: SHIPPED | OUTPATIENT
Start: 2022-08-11

## 2022-08-11 NOTE — PROGRESS NOTES
Garett Landry presents today at the clinic for follow up. Chief Complaint   Patient presents with    Follow-up        Wt Readings from Last 3 Encounters:   08/11/22 292 lb (132.5 kg)   06/23/22 288 lb (130.6 kg)     Temp Readings from Last 3 Encounters:   06/23/22 99.3 °F (37.4 °C) (Oral)     BP Readings from Last 3 Encounters:   06/23/22 (!) 165/100     Pulse Readings from Last 3 Encounters:   06/23/22 98       Health Maintenance Due   Topic    Pneumococcal 0-64 years (1 - PCV)    Foot Exam Q1     MICROALBUMIN Q1     Eye Exam Retinal or Dilated     DTaP/Tdap/Td series (1 - Tdap)    Cervical cancer screen          Learning Assessment:  :     No flowsheet data found. Depression Screening:  :     3 most recent PHQ Screens 8/11/2022   Little interest or pleasure in doing things Not at all   Feeling down, depressed, irritable, or hopeless Not at all   Total Score PHQ 2 0       Fall Risk Assessment:  :     No flowsheet data found. Abuse Screening:  :     No flowsheet data found. Coordination of Care Questionnaire:  :     1. Have you been to the ER, urgent care clinic since your last visit? Hospitalized since your last visit? No    2. Have you seen or consulted any other health care providers outside of the [address] [place] [address] since your last visit? Include any pap smears or colon screening.  No

## 2022-08-11 NOTE — PROGRESS NOTES
HPI:  39 y.o.  presents for follow up appointment. No hospital, ER or specialist visits since last primary care visit except as noted below. Last visit 6/23/22    HTN  On valsartan 160 mg (started 6/23/22) with good tolerance  History of HTN but came off medications with weight loss, has not been treated since 2011 until beginning therapy again 6/2022  BP at home 120s-130s/80s using her home wrist cuff which she brings in today  -No history of heart disease or stroke. No chest pain, no shortness of breath, no headaches, no lower extremity swelling. New onset DM  At visit 6/23/22, she c/o polyuria and polydipsia   A1C 10.6% on 6/24/22 at diagnosis  New start metformin xr 1000 mg qpm and glimepiride 4 mg qam, 6/2022  She had diarrhea with the first dose of metformin and took it once daily x 1 wk, diarrhea 1-3x/day, no increase in the diarrhea when she increased to 2 pills   \"I have GI issues all my life, it is manageable\" \"I've had no constipation\"  She has had one visit with DEP  FBS 190s this week, was in upper 200's at onset    She reports polyuria and polydipsia have resolved, but she does have the sense that she is retaining fluid.     She travels internationally, so does not want injections  [place] blood pressure units for defibrillators, is often in  countries 3-5x/year    Her goal is to lose weight so she can come off of medications and control by diet and lifestyle    Hyperlipidemia  Currently not on statin  Was on statin in her 19's, possible Zocor, came off when lipids improved with weight loss  Current LDL direct 189; total 325, , HDL 42 on 6/24/2022        Patient Active Problem List    Diagnosis    Sleep apnea     diagnosed in [facility] 2016, repeat study after tonstilectomy showed improvement      Hypertension     came off meds in 2011 after losing 100 lbs      Hyperlipemia     came off meds in 2011 after losing 100 lbs           Past Medical History:   Diagnosis Date Hyperlipemia     came off meds in 2011 after losing 100 lbs    Hypertension     came off meds in 2011 after losing 100 lbs    Sleep apnea     diagnosed in [facility] [place] 2016, repeat study after tonstilectomy showed improvement       Social History     Tobacco Use    Smoking status: Never    Smokeless tobacco: Never   Vaping Use    Vaping Use: Never used   Substance Use Topics    Alcohol use: Yes     Comment: occasionally    Drug use: Never       Outpatient Medications Marked as Taking for the 8/11/22 encounter (Office Visit) with Genoveva Farias PA-C   Medication Sig Dispense Refill    OneTouch Ultra2 Meter misc USE TO CHECK BLOOD SUGAR 1 Each 0    metFORMIN ER (GLUCOPHAGE XR) 500 mg tablet Take 1 tab po with dinner x 1 wk, then increase to 2 tabs po with dinner and stay at this dose 180 Tablet 1    glimepiride (AMARYL) 2 mg tablet Take 1 tab po with breakfast x 1 wk, then increase to 2 tabs po with breakfast and stay at this dose 180 Tablet 1    terconazole (TERAZOL 7) 0.4 % vaginal cream Insert 1 Applicator into vagina nightly. 45 g 0    glucose blood VI test strips (ASCENSIA AUTODISC VI, ONE TOUCH ULTRA TEST VI) strip Use to check blood sugar once daily, Please fill brand preferred by insurance, Dx E11.9 100 Strip 3    lancets misc Use to check blood sugar once daily, Please fill brand preferred by insurance, Dx E11.9 100 Each 3    valsartan (DIOVAN) 160 mg tablet Take 1 Tablet by mouth daily. 90 Tablet 1       No Known Allergies    ROS:  ROS negative except as per HPI.       PE:  Visit Vitals  BP (!) 170/98 (BP 1 Location: Right arm, BP Patient Position: Sitting, BP Cuff Size: Large adult)   Pulse 92   Temp 99.4 °F (37.4 °C) (Oral)   Resp 18   Ht 5' 7\" (1.702 m)   Wt 292 lb (132.5 kg)   SpO2 98%   BMI 45.73 kg/m²       BP recheck:  Her cuff 165/101  Our cuff  176/98    Gen: alert, oriented, no acute distress  Head: normocephalic, atraumatic  Ears: external auditory canals clear, TMs without erythema or effusion  Eyes: pupils equal round reactive to light, sclera clear, conjunctiva clear  Oral: moist mucus membranes, no oral lesions, no pharyngeal inflammation or exudate  Neck: symmetric normal sized thyroid, no carotid bruits, no jugular vein distention  Resp: no increase work of breathing, lungs clear to ausculation bilaterally, no wheezing, rales or rhonchi  CV: S1, S2 normal.  No murmurs, rubs, or gallops. Abd: soft, not tender, not distended. No hepatosplenomegaly. Normal bowel sounds. Neuro: cranial nerves intact, normal strength and movement in all extremities, reflexes and sensation intact and symmetric. Skin: no lesion or rash  Extremities: no cyanosis or edema    Diabetic foot exam:     Left Foot:   Visual Exam: callous - heel   Pulse DP: 2+ (normal)   Filament test: normal sensation          Right Foot:   Visual Exam: callous - big toe   Pulse DP: 2+ (normal)   Filament test: normal sensation            No results found for this visit on 08/11/22. Results for orders placed or performed in visit on 06/24/22   URINE CULTURE HOLD SAMPLE    Specimen: Serum   Result Value Ref Range    Urine culture hold        Urine on hold in Microbiology dept for 2 days. If unpreserved urine is submitted, it cannot be used for addtional testing after 24 hours, recollection will be required. CULTURE, URINE    Specimen: Urine   Result Value Ref Range    Special Requests: NO SPECIAL REQUESTS  Reflexed from [id number]        Culture result: No significant growth, <10,000 CFU/mL     HEPATITIS C AB   Result Value Ref Range    Hep C virus Ab Interp.  NONREACTIVE NONREACTIVE     TSH 3RD GENERATION   Result Value Ref Range    TSH 3.00 0.36 - 3.74 uIU/mL   URINALYSIS W/ REFLEX CULTURE    Specimen: Urine   Result Value Ref Range    Color YELLOW/STRAW      Appearance CLOUDY (A) CLEAR      Specific gravity >1.030     pH (UA) 5.5 5.0 - 8.0      Protein 300 (A) Negative mg/dL    Glucose >1,000 (A) Negative mg/dL    Ketone TRACE (A) Negative mg/dL    Bilirubin Negative Negative      Blood SMALL (A) Negative      Urobilinogen 0.2 0.2 - 1.0 EU/dL    Nitrites Negative Negative      Leukocyte Esterase SMALL (A) Negative      WBC 20-50 0 - 4 /hpf    RBC 0-5 0 - 5 /hpf    Epithelial cells MANY (A) FEW /lpf    Bacteria 2+ (A) Negative /hpf    UA:UC IF INDICATED URINE CULTURE ORDERED (A) CULTURE NOT INDICATED BY UA RESULT      Budding yeast PRESENT (A) Negative      Yeast w/hyphae PRESENT (A) Negative     LIPID PANEL   Result Value Ref Range    Cholesterol, total 325 (H) <200 MG/DL    Triglyceride 607 (H) <150 MG/DL    HDL Cholesterol 42 MG/DL    LDL, calculated Not calculated due to elevated triglyceride level 0 - 100 MG/DL    VLDL, calculated  MG/DL     Calculation not valid with this patient's other Lipid values. CHOL/HDL Ratio 7.7 (H) 0.0 - 5.0     HEMOGLOBIN A1C WITH EAG   Result Value Ref Range    Hemoglobin A1c 10.6 (H) 4.0 - 5.6 %    Est. average glucose 258 mg/dL   CBC WITH AUTOMATED DIFF   Result Value Ref Range    WBC 8.1 3.6 - 11.0 K/uL    RBC 5.50 (H) 3.80 - 5.20 M/uL    HGB 16.1 (H) 11.5 - 16.0 g/dL    HCT 47.6 (H) 35.0 - 47.0 %    MCV 86.5 80.0 - 99.0 FL    MCH 29.3 26.0 - 34.0 PG    MCHC 33.8 30.0 - 36.5 g/dL    RDW 13.3 11.5 - 14.5 %    PLATELET 345 859 - 889 K/uL    MPV 9.1 8.9 - 12.9 FL    NRBC 0.0 0  WBC    ABSOLUTE NRBC 0.00 0.00 - 0.01 K/uL    NEUTROPHILS 61 32 - 75 %    LYMPHOCYTES 30 12 - 49 %    MONOCYTES 5 5 - 13 %    EOSINOPHILS 2 0 - 7 %    BASOPHILS 1 0 - 1 %    IMMATURE GRANULOCYTES 1 (H) 0.0 - 0.5 %    ABS. NEUTROPHILS 5.0 1.8 - 8.0 K/UL    ABS. LYMPHOCYTES 2.4 0.8 - 3.5 K/UL    ABS. MONOCYTES 0.4 0.0 - 1.0 K/UL    ABS. EOSINOPHILS 0.1 0.0 - 0.4 K/UL    ABS. BASOPHILS 0.0 0.0 - 0.1 K/UL    ABS. IMM.  GRANS. 0.0 0.00 - 0.04 K/UL    DF AUTOMATED     METABOLIC PANEL, COMPREHENSIVE   Result Value Ref Range    Sodium 136 136 - 145 mmol/L    Potassium 4.3 3.5 - 5.1 mmol/L    Chloride 101 97 - 108 mmol/L    CO2 27 21 - 32 mmol/L Anion gap 8 5 - 15 mmol/L    Glucose 250 (H) 65 - 100 mg/dL    BUN 10 6 - 20 MG/DL    Creatinine 0.55 0.55 - 1.02 MG/DL    BUN/Creatinine ratio 18 12 - 20      GFR est AA >60 >60 ml/min/1.73m2    GFR est non-AA >60 >60 ml/min/1.73m2    Calcium 10.3 (H) 8.5 - 10.1 MG/DL    Bilirubin, total 0.4 0.2 - 1.0 MG/DL    ALT (SGPT) 197 (H) 12 - 78 U/L    AST (SGOT) 121 (H) 15 - 37 U/L    Alk. phosphatase 135 (H) 45 - 117 U/L    Protein, total 7.7 6.4 - 8.2 g/dL    Albumin 4.1 3.5 - 5.0 g/dL    Globulin 3.6 2.0 - 4.0 g/dL    A-G Ratio 1.1 1.1 - 2.2     NUSWAB VAGINITIS PLUS (VG+) WITH CANDIDA (SIX SPECIES)   Result Value Ref Range    Atopobium vaginae SEE COMMENT Score    BVAB 2 SEE COMMENT Score    Megasphaera 1 SEE COMMENT Score    C. albicans, NICKY Positive (A) Negative      C. glabrata, NICKY Negative Negative      C PARAPSILOSIS/TROPICALIS Negative Negative      Candida lusitaniae Negative Negative      Candida krusei Negative Negative      T. vaginalis, NICKY Negative Negative      C. trachomatis, NICKY Negative Negative      N. gonorrhoeae, NICKY Negative Negative     LDL, DIRECT   Result Value Ref Range    LDL,Direct 189 (H) 0 - 100 mg/dl         Assessment/Plan:      ICD-10-CM ICD-9-CM    1. New onset type 2 diabetes mellitus (HCC)  E11.9 250.00 semaglutide (Rybelsus) 3 mg tablet      HEMOGLOBIN A1C WITH EAG      METABOLIC PANEL, BASIC      MICROALBUMIN, UR, RAND W/ MICROALB/CREAT RATIO       DIABETES FOOT EXAM      METABOLIC PANEL, BASIC      HEMOGLOBIN A1C WITH EAG      MICROALBUMIN, UR, RAND W/ MICROALB/CREAT RATIO      2. Essential hypertension  T52 357.1 METABOLIC PANEL, BASIC      hydroCHLOROthiazide (HYDRODIURIL) 25 mg tablet      METABOLIC PANEL, BASIC      3. Hypercholesterolemia with hypertriglyceridemia  E78.2 272.2 atorvastatin (LIPITOR) 40 mg tablet      4. Rectal bleeding  K62.5 569.3 OCCULT BLOOD IMMUNOASSAY,DIAGNOSTIC      5. Constipation, unspecified constipation type  K59.00 564.00         1.   New onset diabetes  Diagnosed 6/24/2022 with A1c 10.6% in setting of polyuria and polydipsia  Now on metformin XR 1000 mg at night and glimepiride 4 mg each morning  She is having diarrhea since beginning the metformin but would like to continue  She is getting fasting blood sugars at home in the 190s  I would like to increase her therapy, I do not feel comfortable going higher on the metformin given her diarrhea. She declines in injectables since she travels internationally 3-5 times a year, checking even just her pill medications is sometimes difficult when going through customs, and she is in part of the road where refrigeration is difficult as well. New start Rybelsus 3 mg in the morning. Reviewed risk of hypoglycemia with glimepiride  She will reduce glimepiride to 2 mg as her blood sugars approach 120s  She will continue with diabetes education program, she has had 1 visit thus far  We will check microalbumin today  She is on ARB  Reviewed standard of care with statin, see below  Foot exam today normal sensation but with some calluses, foot care reviewed    2. Hypertension  Current therapy: Valsartan 160 mg (since 6/23/2022)  She reports blood pressures at home in the 708C to 644O systolic over 24F diastolic  Blood pressure rechecked by me today in office 176/98  She brought in her home cuff, wrist cuff, and was 165/101, so fairly close  She does mention some water retention since her polyuria and polydipsia have resolved  We will add HCTZ 25 mg to her current valsartan and continue to monitor    3. Hypercholesterolemia with hypertriglyceridemia  Current , triglycerides 607  Expect triglycerides to improve with improving blood sugar control  New start atorvastatin 40 mg today    4. Rectal bleeding mentioned previously in setting of constipation  She reports this is now resolved but returned stool Hemoccult cards today  We will continue to monitor    5.   Constipation  She reports this has resolved with the addition of metformin  Will continue to monitor      Health Maintenance reviewed - updated. Orders Placed This Encounter    HEMOGLOBIN A1C WITH EAG     Standing Status:   Future     Number of Occurrences:   1     Standing Expiration Date:   8/64/1773    METABOLIC PANEL, BASIC     Standing Status:   Future     Number of Occurrences:   1     Standing Expiration Date:   8/11/2023    MICROALBUMIN, UR, RAND W/ MICROALB/CREAT RATIO     Standing Status:   Future     Number of Occurrences:   1     Standing Expiration Date:   8/11/2023    HM DIABETES FOOT EXAM    semaglutide (Rybelsus) 3 mg tablet     Sig: Take 1 Tablet by mouth Daily (before breakfast). Dispense:  90 Tablet     Refill:  1    atorvastatin (LIPITOR) 40 mg tablet     Sig: Take 1 Tablet by mouth in the morning. Dispense:  90 Tablet     Refill:  1    hydroCHLOROthiazide (HYDRODIURIL) 25 mg tablet     Sig: Take 1 Tablet by mouth in the morning. Dispense:  30 Tablet     Refill:  5       There are no discontinued medications. Current Outpatient Medications   Medication Sig Dispense Refill    semaglutide (Rybelsus) 3 mg tablet Take 1 Tablet by mouth Daily (before breakfast). 90 Tablet 1    atorvastatin (LIPITOR) 40 mg tablet Take 1 Tablet by mouth in the morning. 90 Tablet 1    hydroCHLOROthiazide (HYDRODIURIL) 25 mg tablet Take 1 Tablet by mouth in the morning. 30 Tablet 5    OneTouch Ultra2 Meter misc USE TO CHECK BLOOD SUGAR 1 Each 0    metFORMIN ER (GLUCOPHAGE XR) 500 mg tablet Take 1 tab po with dinner x 1 wk, then increase to 2 tabs po with dinner and stay at this dose 180 Tablet 1    glimepiride (AMARYL) 2 mg tablet Take 1 tab po with breakfast x 1 wk, then increase to 2 tabs po with breakfast and stay at this dose 180 Tablet 1    terconazole (TERAZOL 7) 0.4 % vaginal cream Insert 1 Applicator into vagina nightly.  45 g 0    glucose blood VI test strips (ASCENSIA AUTODISC VI, ONE TOUCH ULTRA TEST VI) strip Use to check blood sugar once daily, Please fill brand preferred by insurance, Dx E11.9 100 Strip 3    lancets misc Use to check blood sugar once daily, Please fill brand preferred by insurance, Dx E11.9 100 Each 3    valsartan (DIOVAN) 160 mg tablet Take 1 Tablet by mouth daily. 90 Tablet 1       Recommended healthy diet low in carbohydrates, fats, sodium and cholesterol. Recommended regular cardiovascular exercise 3-6 times per week for 30-60 minutes daily. Verbal and written instructions (see AVS) provided. Patient expresses understanding of diagnosis and treatment plan. Follow-up and Dispositions    Return in about 4 weeks (around 9/8/2022) for HTN, DM.        Future Appointments   Date Time Provider Joseph Gonzalez   8/12/2022  1:00 PM Nasima Mclaughlin RN [facility] BS AMB   9/1/2022  8:30 AM Genoveva Hernández, PA-C [facility] AMB

## 2022-08-12 ENCOUNTER — VIRTUAL VISIT (OUTPATIENT)
Dept: DIABETES SERVICES | Age: 42
End: 2022-08-12
Payer: COMMERCIAL

## 2022-08-12 DIAGNOSIS — E11.65 TYPE 2 DIABETES MELLITUS WITH HYPERGLYCEMIA, WITHOUT LONG-TERM CURRENT USE OF INSULIN (HCC): Primary | ICD-10-CM

## 2022-08-12 PROCEDURE — G0108 DIAB MANAGE TRN  PER INDIV: HCPCS

## 2022-08-12 NOTE — PROGRESS NOTES
[facility] Program for Diabetes Health  Diabetes Self-Management Education & Support Program  Encounter Note    SUMMARY  Diabetes self-care management training was completed related to reducing risks. The participant will contact [employer] to schedule appointment. The participant did not identify SMART Goal(s) and will practice knowledge and skills related to reducing risks to improve diabetes self-management. EVALUATION:  Participant expressed understanding of T 2 DM disease process & the ADA targets for BG & A1c. Expressed understanding of the role of vaccinations, eye, dental, and foot exams in preventing DM complications & the relationship between DM & heart, kidney, eye, nerve & sleep health. Participant shared she saw provider yesterday and has lost weight, BG is much improved and A1c is 8.3. She shared has read diabetes handbook, and understands the content. She will contact [employer] if she wishes to schedule another appointment. RECOMMENDATIONS:  Track self care practices to include BG, meals & exercise. Encouraged to keep track of diabetes health using a personal care record. TOPICS DISCUSSED TODAY:  WHAT IS DIABETES? Minutes: Simona Toribio 46 EVALUATION     8/12/2022 WHAT IS DIABETES? Role of the normal pancreas in energy balance and blood glucose control   The defect seen in diabetes   Signs & symptoms of diabetes   Diagnosis of diabetes   Types of diabetes   Blood glucose targets in non-pregnant & non-pregnant adults       The participant knows  Their type of diabetes: Yes  The basic physiologic defect: Yes  Blood glucose targets: Yes     DATE DSMES TOPIC EVALUATION     8/12/2022 HOW DO I STAY HEALTHY?    Prevention   Vaccinations   Preconception care (if applicable)  Examinations   Eye    Foot   Diabetic complications' prevention   Dental health   Heart health   Kidney Health   Nerve health   Sleep health      The participant has a personal diabetes care record to keep abreast of diabetes health No     The participant needs to address encouraged to keep track of diabetes health using a personal care record. Amanda Sharp RN on 8/12/2022 at 2:01 PM    I have personally reviewed the health record, including provider notes, laboratory data and current medications before making these care and education recommendations. The time spent in this effort is included in the total time. Total minutes: 60    [id number] Emergency Adaptations for Telehealth:  Jimmy Rodriguez, was evaluated through a synchronous (real-time) audio-video encounter. The patient (or guardian if applicable) is aware that this is a billable service, which includes applicable co-pays. This Virtual Visit was conducted with patient's (and/or legal guardian's) consent. The visit was conducted pursuant to the emergency declaration under the [address] [place] [address], [address] [place] [address] waiver authority and the [employer] Act. Patient identification was verified, and a caregiver was present when appropriate. The patient was located in a state where the provider was licensed to provide care.      Patient In home  Provider In facility

## 2022-08-16 LAB — HEMOCCULT STL QL IA: NEGATIVE

## 2022-09-01 ENCOUNTER — OFFICE VISIT (OUTPATIENT)
Dept: INTERNAL MEDICINE CLINIC | Age: 42
End: 2022-09-01
Payer: COMMERCIAL

## 2022-09-01 VITALS
HEIGHT: 67 IN | BODY MASS INDEX: 44.12 KG/M2 | HEART RATE: 84 BPM | OXYGEN SATURATION: 97 % | DIASTOLIC BLOOD PRESSURE: 80 MMHG | SYSTOLIC BLOOD PRESSURE: 142 MMHG | WEIGHT: 281.1 LBS | RESPIRATION RATE: 16 BRPM

## 2022-09-01 DIAGNOSIS — E78.2 HYPERCHOLESTEROLEMIA WITH HYPERTRIGLYCERIDEMIA: ICD-10-CM

## 2022-09-01 DIAGNOSIS — R80.9 MICROALBUMINURIA: ICD-10-CM

## 2022-09-01 DIAGNOSIS — E11.9 NEW ONSET TYPE 2 DIABETES MELLITUS (HCC): Primary | ICD-10-CM

## 2022-09-01 DIAGNOSIS — I10 ESSENTIAL HYPERTENSION: ICD-10-CM

## 2022-09-01 DIAGNOSIS — R74.8 ELEVATED LIVER ENZYMES: ICD-10-CM

## 2022-09-01 PROCEDURE — 99214 OFFICE O/P EST MOD 30 MIN: CPT | Performed by: PHYSICIAN ASSISTANT

## 2022-09-01 PROCEDURE — 3052F HG A1C>EQUAL 8.0%<EQUAL 9.0%: CPT | Performed by: PHYSICIAN ASSISTANT

## 2022-09-01 NOTE — PROGRESS NOTES
HPI:  39 y.o.  presents for follow up appointment. No hospital, ER or specialist visits since last primary care visit except as noted below. Last visit 8/11/22    New onset diabetes  Diagnosed 6/24/2022 with A1c 10.6% in setting of polyuria and polydipsia  Visti 8/11/22 - Now on metformin XR 1000 mg at night and glimepiride 4 mg each morning  She is having diarrhea since beginning the metformin but would like to continue  She is getting fasting blood sugars at home in the 190s  I would like to increase her therapy, I do not feel comfortable going higher on the metformin given her diarrhea. She declines in injectables since she travels internationally 3-5 times a year, checking even just her pill medications is sometimes difficult when going through customs, and she is in part of the road where refrigeration is difficult as well. New start Rybelsus 3 mg in the morning.   Reviewed risk of hypoglycemia with glimepiride  She will reduce glimepiride to 2 mg as her blood sugars approach 120s  She will continue with diabetes education program, she has had 1 visit thus far  We will check microalbumin today  She is on ARB  Reviewed standard of care with statin, see below  Foot exam today normal sensation but with some calluses, foot care reviewed    Labs 8/11/22 A1C 8.3%, (+)microalbuminuria    TODAY - 9/1/22 - current therapy: metformin XR 1000 mg qpm, glimeperide 2 mg qAM, and Rybelsus 3 mg qAM   this week  -she stopped the DEP since she stated \"it was not of value\" and her insurance was charging a copay, so she is reading independently     Hypertension  Current therapy: Valsartan 160 mg (since 6/23/2022) and HCTZ 25 mg (8/11/22)  She reports blood pressures at home in the 045V systolic over 85-25I diastolic       Hypercholesterolemia with hypertriglyceridemia  Most recent lipids , triglycerides 607 on 6/24/22  Expect triglycerides to improve with improving blood sugar control  New start atorvastatin 40 mg (8/11/22)    Elevated liver enzymes  She reports h/o fatty liver since her 25s  Hep C Ab negative  No RUQ pain, N/V, jaundice    Patient Active Problem List    Diagnosis    Sleep apnea     diagnosed in [facility] 2016, repeat study after tonstilectomy showed improvement      Hypertension     came off meds in 2011 after losing 100 lbs      Hyperlipemia     came off meds in 2011 after losing 100 lbs           Past Medical History:   Diagnosis Date    Hyperlipemia     came off meds in 2011 after losing 100 lbs    Hypertension     came off meds in 2011 after losing 100 lbs    Sleep apnea     diagnosed in [facility] 2016, repeat study after tonstilectomy showed improvement       Social History     Tobacco Use    Smoking status: Never    Smokeless tobacco: Never   Vaping Use    Vaping Use: Never used   Substance Use Topics    Alcohol use: Yes     Comment: occasionally    Drug use: Never       Outpatient Medications Marked as Taking for the 9/1/22 encounter (Office Visit) with Genoveva Royal PA-C   Medication Sig Dispense Refill    semaglutide (Rybelsus) 3 mg tablet Take 1 Tablet by mouth Daily (before breakfast). 90 Tablet 1    atorvastatin (LIPITOR) 40 mg tablet Take 1 Tablet by mouth in the morning. 90 Tablet 1    hydroCHLOROthiazide (HYDRODIURIL) 25 mg tablet Take 1 Tablet by mouth in the morning. 30 Tablet 5    OneTouch Ultra2 Meter misc USE TO CHECK BLOOD SUGAR 1 Each 0    metFORMIN ER (GLUCOPHAGE XR) 500 mg tablet Take 1 tab po with dinner x 1 wk, then increase to 2 tabs po with dinner and stay at this dose 180 Tablet 1    glimepiride (AMARYL) 2 mg tablet Take 1 tab po with breakfast x 1 wk, then increase to 2 tabs po with breakfast and stay at this dose 180 Tablet 1    terconazole (TERAZOL 7) 0.4 % vaginal cream Insert 1 Applicator into vagina nightly.  45 g 0    glucose blood VI test strips (ASCENSIA AUTODISC VI, ONE TOUCH ULTRA TEST VI) strip Use to check blood sugar once daily, Please fill brand preferred by insurance, Dx E11.9 100 Strip 3    lancets misc Use to check blood sugar once daily, Please fill brand preferred by insurance, Dx E11.9 100 Each 3    valsartan (DIOVAN) 160 mg tablet Take 1 Tablet by mouth daily. 90 Tablet 1       No Known Allergies    ROS:  ROS negative except as per HPI. PE:  Visit Vitals  BP (!) 140/83 (BP 1 Location: Left arm, BP Patient Position: Sitting, BP Cuff Size: Large adult)   Pulse 84   Resp 16   Ht 5' 7\" (1.702 m)   Wt 281 lb 1.6 oz (127.5 kg)   LMP 08/10/2022   SpO2 97%   BMI 44.03 kg/m²     Gen: alert, oriented, no acute distress  Head: normocephalic, atraumatic  Eyes: pupils equal round reactive to light, sclera clear, conjunctiva clear (no jaundice)  Oral: moist mucus membranes, no oral lesions, no pharyngeal inflammation or exudate  Neck: supple  Resp: no increase work of breathing, lungs clear to ausculation bilaterally, no wheezing, rales or rhonchi  CV: S1, S2 normal.  No murmurs, rubs, or gallops. Abd: soft, not tender, not distended. No hepatosplenomegaly. Normal bowel sounds. Neuro: grossly intact  Skin: no lesion or rash  Extremities: no cyanosis or edema    No results found for this visit on 09/01/22.     Results for orders placed or performed in visit on 08/11/22   OCCULT BLOOD IMMUNOASSAY,DIAGNOSTIC   Result Value Ref Range    Occult blood fecal, by IA Negative Negative   METABOLIC PANEL, BASIC   Result Value Ref Range    Sodium 140 136 - 145 mmol/L    Potassium 4.1 3.5 - 5.1 mmol/L    Chloride 104 97 - 108 mmol/L    CO2 28 21 - 32 mmol/L    Anion gap 8 5 - 15 mmol/L    Glucose 146 (H) 65 - 100 mg/dL    BUN 13 6 - 20 MG/DL    Creatinine 0.53 (L) 0.55 - 1.02 MG/DL    BUN/Creatinine ratio 25 (H) 12 - 20      GFR est AA >60 >60 ml/min/1.73m2    GFR est non-AA >60 >60 ml/min/1.73m2    Calcium 9.9 8.5 - 10.1 MG/DL   HEMOGLOBIN A1C WITH EAG   Result Value Ref Range    Hemoglobin A1c 8.3 (H) 4.0 - 5.6 %    Est. average glucose 192 mg/dL   MICROALBUMIN, UR, RAND W/ MICROALB/CREAT RATIO   Result Value Ref Range    Microalbumin,urine random 32.70 MG/DL    Creatinine, urine 59.40 mg/dL    Microalbumin/Creat ratio (mg/g creat) 551 (H) 0 - 30 mg/g     Lab Results   Component Value Date/Time    Cholesterol, total 325 (H) 06/24/2022 10:37 AM    HDL Cholesterol 42 06/24/2022 10:37 AM    LDL,Direct 189 (H) 06/24/2022 10:37 AM    LDL, calculated Not calculated due to elevated triglyceride level 06/24/2022 10:37 AM    VLDL, calculated  06/24/2022 10:37 AM     Calculation not valid with this patient's other Lipid values. Triglyceride 607 (H) 06/24/2022 10:37 AM    CHOL/HDL Ratio 7.7 (H) 06/24/2022 10:37 AM     Lab Results   Component Value Date/Time    Sodium 140 08/11/2022 09:22 AM    Potassium 4.1 08/11/2022 09:22 AM    Chloride 104 08/11/2022 09:22 AM    CO2 28 08/11/2022 09:22 AM    Anion gap 8 08/11/2022 09:22 AM    Glucose 146 (H) 08/11/2022 09:22 AM    BUN 13 08/11/2022 09:22 AM    Creatinine 0.53 (L) 08/11/2022 09:22 AM    BUN/Creatinine ratio 25 (H) 08/11/2022 09:22 AM    GFR est AA >60 08/11/2022 09:22 AM    GFR est non-AA >60 08/11/2022 09:22 AM    Calcium 9.9 08/11/2022 09:22 AM    Bilirubin, total 0.4 06/24/2022 10:37 AM    Alk. phosphatase 135 (H) 06/24/2022 10:37 AM    Protein, total 7.7 06/24/2022 10:37 AM    Albumin 4.1 06/24/2022 10:37 AM    Globulin 3.6 06/24/2022 10:37 AM    A-G Ratio 1.1 06/24/2022 10:37 AM    ALT (SGPT) 197 (H) 06/24/2022 10:37 AM    AST (SGOT) 121 (H) 06/24/2022 10:37 AM         Assessment/Plan:      ICD-10-CM ICD-9-CM    1. New onset type 2 diabetes mellitus (HCC)  N48.0 205.05 METABOLIC PANEL, COMPREHENSIVE      HEMOGLOBIN A1C WITH EAG      OTHER      2. Essential hypertension  G73 913.4 METABOLIC PANEL, COMPREHENSIVE      OTHER      3. Hypercholesterolemia with hypertriglyceridemia  T55.2 395.7 METABOLIC PANEL, COMPREHENSIVE      4. Elevated liver enzymes  W21.0 579.6 METABOLIC PANEL, COMPREHENSIVE      5.  Microalbuminuria  R80.9 791.0 1.  New onset diabetes  Diagnosed 6/24/22 with A1c 10.6% in setting of polyuria and polydipsia  Current A1c 8.3% on 8/11/2022  Current therapy: Metformin XR 1000 mg at night, glimepiride 2 mg each morning, Rybelsus 3 mg each morning  She is now on ARB and statin  Positive microalbuminuria with most recent labs  She is tolerating adjustments to her medications, will continue current regimen and recheck A1c in 3 weeks    2. Hypertension  Current therapy: Valsartan 160 mg and HCTZ 25 mg daily  Home blood pressures in the 120s over 70s to 80s  Her home cuff has been brought in and checked and is accurate  Blood pressure today 142/80  With normal readings at home and HCTZ was just added 3 weeks ago, will continue current medications and recheck at next visit    3. Hypercholesterolemia with hypertriglyceridemia  Now on atorvastatin 40 mg since 8/11/2022 with good tolerance  Will monitor liver enzymes at the 6-week aleks and check fasting lipids at 12 weeks    4. Elevated liver enzymes  These were elevated in June  She reports history of fatty liver since she was in her 25s  She has been losing weight and is down 11 pounds since last visit  Will continue to monitor with next labs  Hepatitis C serologies negative    5. Microalbuminuria  Noted at onset of diabetes  She is now on diabetic regimen and ARB, will recheck at her visit in November        Health Maintenance reviewed - updated.     I wrote a prescription recommending she get the second COVID booster due to her diabetes and her international travel for her job    Orders Placed This Encounter    METABOLIC PANEL, COMPREHENSIVE     Standing Status:   Future     Standing Expiration Date:   9/1/2023    HEMOGLOBIN A1C WITH EAG     Standing Status:   Future     Standing Expiration Date:   9/1/2023    OTHER     Sig: Please administer 2nd Covid Booster, due to high risk medical conditions (Dx E11.9, I10) and international travel     Dispense:  1 Each     Refill:  0 There are no discontinued medications. Current Outpatient Medications   Medication Sig Dispense Refill    OTHER Please administer 2nd Covid Booster, due to high risk medical conditions (Dx E11.9, I10) and international travel 1 Each 0    semaglutide (Rybelsus) 3 mg tablet Take 1 Tablet by mouth Daily (before breakfast). 90 Tablet 1    atorvastatin (LIPITOR) 40 mg tablet Take 1 Tablet by mouth in the morning. 90 Tablet 1    hydroCHLOROthiazide (HYDRODIURIL) 25 mg tablet Take 1 Tablet by mouth in the morning. 30 Tablet 5    OneTouch Ultra2 Meter misc USE TO CHECK BLOOD SUGAR 1 Each 0    metFORMIN ER (GLUCOPHAGE XR) 500 mg tablet Take 1 tab po with dinner x 1 wk, then increase to 2 tabs po with dinner and stay at this dose 180 Tablet 1    glimepiride (AMARYL) 2 mg tablet Take 1 tab po with breakfast x 1 wk, then increase to 2 tabs po with breakfast and stay at this dose 180 Tablet 1    terconazole (TERAZOL 7) 0.4 % vaginal cream Insert 1 Applicator into vagina nightly. 45 g 0    glucose blood VI test strips (ASCENSIA AUTODISC VI, ONE TOUCH ULTRA TEST VI) strip Use to check blood sugar once daily, Please fill brand preferred by insurance, Dx E11.9 100 Strip 3    lancets misc Use to check blood sugar once daily, Please fill brand preferred by insurance, Dx E11.9 100 Each 3    valsartan (DIOVAN) 160 mg tablet Take 1 Tablet by mouth daily. 90 Tablet 1       Recommended healthy diet low in carbohydrates, fats, sodium and cholesterol. Recommended regular cardiovascular exercise 3-6 times per week for 30-60 minutes daily. Verbal and written instructions (see AVS) provided. Patient expresses understanding of diagnosis and treatment plan. Follow-up and Dispositions    Return in about 9 weeks (around 11/3/2022) for HTN, DM (fasting visit); also 2nd appt for labs in 3 wks from today.        Future Appointments   Date Time Provider Joseph Gonzalez   9/22/2022  8:20 AM LAB ONLY [facility]   11/3/2022  8:30 AM Genoveva Hernández PA-C [facility]

## 2022-09-01 NOTE — PROGRESS NOTES
Brandon Hay presents today at the clinic for follow up. Chief Complaint   Patient presents with    Follow-up        Wt Readings from Last 3 Encounters:   09/01/22 281 lb 1.6 oz (127.5 kg)   08/11/22 292 lb (132.5 kg)   06/23/22 288 lb (130.6 kg)     Temp Readings from Last 3 Encounters:   08/11/22 99.4 °F (37.4 °C) (Oral)   06/23/22 99.3 °F (37.4 °C) (Oral)     BP Readings from Last 3 Encounters:   08/11/22 (!) 176/98   06/23/22 (!) 165/100     Pulse Readings from Last 3 Encounters:   08/11/22 92   06/23/22 98       Health Maintenance Due   Topic    Pneumococcal 0-64 years (1 - PCV)    Eye Exam Retinal or Dilated     DTaP/Tdap/Td series (1 - Tdap)    Cervical cancer screen     Flu Vaccine (1)         Learning Assessment:  :     No flowsheet data found. Depression Screening:  :     3 most recent PHQ Screens 9/1/2022   Little interest or pleasure in doing things Not at all   Feeling down, depressed, irritable, or hopeless Not at all   Total Score PHQ 2 0       Fall Risk Assessment:  :     No flowsheet data found. Abuse Screening:  :     No flowsheet data found. Coordination of Care Questionnaire:  :     1. Have you been to the ER, urgent care clinic since your last visit? Hospitalized since your last visit? No    2. Have you seen or consulted any other health care providers outside of the [address] [place] [address] since your last visit? Include any pap smears or colon screening.  No

## 2022-09-22 ENCOUNTER — APPOINTMENT (OUTPATIENT)
Dept: INTERNAL MEDICINE CLINIC | Age: 42
End: 2022-09-22

## 2022-09-22 DIAGNOSIS — I10 ESSENTIAL HYPERTENSION: ICD-10-CM

## 2022-09-22 DIAGNOSIS — E11.9 NEW ONSET TYPE 2 DIABETES MELLITUS (HCC): ICD-10-CM

## 2022-09-22 DIAGNOSIS — R74.8 ELEVATED LIVER ENZYMES: ICD-10-CM

## 2022-09-22 DIAGNOSIS — E78.2 HYPERCHOLESTEROLEMIA WITH HYPERTRIGLYCERIDEMIA: ICD-10-CM

## 2022-09-22 LAB
ALBUMIN SERPL-MCNC: 3.7 G/DL (ref 3.5–5)
ALBUMIN/GLOB SERPL: 1.2 {RATIO} (ref 1.1–2.2)
ALP SERPL-CCNC: 108 U/L (ref 45–117)
ALT SERPL-CCNC: 54 U/L (ref 12–78)
ANION GAP SERPL CALC-SCNC: 4 MMOL/L (ref 5–15)
AST SERPL-CCNC: 22 U/L (ref 15–37)
BILIRUB SERPL-MCNC: 0.3 MG/DL (ref 0.2–1)
BUN SERPL-MCNC: 13 MG/DL (ref 6–20)
BUN/CREAT SERPL: 24 (ref 12–20)
CALCIUM SERPL-MCNC: 9.8 MG/DL (ref 8.5–10.1)
CHLORIDE SERPL-SCNC: 106 MMOL/L (ref 97–108)
CO2 SERPL-SCNC: 29 MMOL/L (ref 21–32)
CREAT SERPL-MCNC: 0.55 MG/DL (ref 0.55–1.02)
EST. AVERAGE GLUCOSE BLD GHB EST-MCNC: 151 MG/DL
GLOBULIN SER CALC-MCNC: 3.2 G/DL (ref 2–4)
GLUCOSE SERPL-MCNC: 158 MG/DL (ref 65–100)
HBA1C MFR BLD: 6.9 % (ref 4–5.6)
POTASSIUM SERPL-SCNC: 4.2 MMOL/L (ref 3.5–5.1)
PROT SERPL-MCNC: 6.9 G/DL (ref 6.4–8.2)
SODIUM SERPL-SCNC: 139 MMOL/L (ref 136–145)

## 2022-09-28 NOTE — PROGRESS NOTES
MyChart message sent  Your A1c looks great at 6.9%! Keep up the great work with diet and continue same medication regimen.   Remainder labs normal.

## 2022-10-19 ENCOUNTER — PATIENT MESSAGE (OUTPATIENT)
Dept: INTERNAL MEDICINE CLINIC | Age: 42
End: 2022-10-19

## 2022-10-19 ENCOUNTER — TELEPHONE (OUTPATIENT)
Dept: INTERNAL MEDICINE CLINIC | Age: 42
End: 2022-10-19

## 2022-10-19 DIAGNOSIS — U07.1 COVID-19 VIRUS INFECTION: Primary | ICD-10-CM

## 2022-10-19 NOTE — TELEPHONE ENCOUNTER
Yes, she qualifies. Please call patient to let her know I sent in prescription for Paxlovid  to her pharmacy.   It comes in a Dosepak and she takes a total of 3 pills twice a day x 5 days

## 2022-10-19 NOTE — TELEPHONE ENCOUNTER
From: Marvin Holt  To: Genoveva Epstein PA-C  Sent: 10/19/2022 8:55 AM EDT  Subject: Tested Positive for Covid     Wagner Tomas,    I tested positive for Covid this morning. What do I need to do to get antivirals prescribed? I will also try to call your nurse and ask. Thanks.

## 2022-10-19 NOTE — TELEPHONE ENCOUNTER
Patient called and stated that she tested positive today for covid. Stated she was recently out of the country in [place] and returned home on Monday. Stated she began having symptoms on Tuesday such as sore throat, congestion (chest/nasal), and muscle aches. Patient would like to know if she is a candidate for the anti-viral medication. Please advise.

## 2022-10-20 ENCOUNTER — TELEPHONE (OUTPATIENT)
Dept: INTERNAL MEDICINE CLINIC | Age: 42
End: 2022-10-20

## 2022-10-20 NOTE — TELEPHONE ENCOUNTER
Called spoke to pt. Advised her of the medication called into her pharmacy . Patient states she is already taking the meds.

## 2022-10-20 NOTE — TELEPHONE ENCOUNTER
Called, left  for pt to return call to office. Left msg about the  medication called into her pharmacy.  Per Yomaira Fernandez

## 2022-11-28 ENCOUNTER — OFFICE VISIT (OUTPATIENT)
Dept: INTERNAL MEDICINE CLINIC | Age: 42
End: 2022-11-28
Payer: COMMERCIAL

## 2022-11-28 VITALS
RESPIRATION RATE: 16 BRPM | OXYGEN SATURATION: 98 % | WEIGHT: 286.2 LBS | TEMPERATURE: 98.8 F | BODY MASS INDEX: 44.92 KG/M2 | HEIGHT: 67 IN | DIASTOLIC BLOOD PRESSURE: 89 MMHG | SYSTOLIC BLOOD PRESSURE: 143 MMHG | HEART RATE: 85 BPM

## 2022-11-28 DIAGNOSIS — I10 ESSENTIAL HYPERTENSION: ICD-10-CM

## 2022-11-28 DIAGNOSIS — R74.8 ELEVATED LIVER ENZYMES: ICD-10-CM

## 2022-11-28 DIAGNOSIS — E11.9 NEW ONSET TYPE 2 DIABETES MELLITUS (HCC): Primary | ICD-10-CM

## 2022-11-28 DIAGNOSIS — R80.9 MICROALBUMINURIA: ICD-10-CM

## 2022-11-28 DIAGNOSIS — E78.2 HYPERCHOLESTEROLEMIA WITH HYPERTRIGLYCERIDEMIA: ICD-10-CM

## 2022-11-28 PROCEDURE — 3077F SYST BP >= 140 MM HG: CPT | Performed by: PHYSICIAN ASSISTANT

## 2022-11-28 PROCEDURE — 3044F HG A1C LEVEL LT 7.0%: CPT | Performed by: PHYSICIAN ASSISTANT

## 2022-11-28 PROCEDURE — 99214 OFFICE O/P EST MOD 30 MIN: CPT | Performed by: PHYSICIAN ASSISTANT

## 2022-11-28 PROCEDURE — 3079F DIAST BP 80-89 MM HG: CPT | Performed by: PHYSICIAN ASSISTANT

## 2022-11-28 RX ORDER — METFORMIN HYDROCHLORIDE 500 MG/1
1000 TABLET, EXTENDED RELEASE ORAL
Qty: 180 TABLET | Refills: 1 | Status: SHIPPED | OUTPATIENT
Start: 2022-11-28

## 2022-11-28 RX ORDER — GLIMEPIRIDE 2 MG/1
4 TABLET ORAL
Qty: 180 TABLET | Refills: 1 | Status: SHIPPED | OUTPATIENT
Start: 2022-11-28

## 2022-11-28 RX ORDER — VALSARTAN AND HYDROCHLOROTHIAZIDE 160; 25 MG/1; MG/1
1 TABLET ORAL DAILY
Qty: 90 TABLET | Refills: 1 | Status: SHIPPED | OUTPATIENT
Start: 2022-11-28

## 2022-11-28 NOTE — PROGRESS NOTES
HIPAA verified by two patient identifiers. Eufemia Bui is a 39 y.o. female    Chief Complaint   Patient presents with    Hypertension     9 weeks    Cholesterol Problem       Visit Vitals  BP (!) 143/89 (BP 1 Location: Left upper arm, BP Patient Position: Sitting, BP Cuff Size: Adult long)   Pulse 85   Temp 98.8 °F (37.1 °C) (Oral)   Resp 16   Ht 5' 7\" (1.702 m)   Wt 286 lb 3.2 oz (129.8 kg)   SpO2 98%   BMI 44.83 kg/m²       Pain Scale: 0 - No pain/10  Pain Location:       Health Maintenance Due   Topic Date Due    Pneumococcal 0-64 years (1 - PCV) Never done    Eye Exam Retinal or Dilated  Never done    Hepatitis B Vaccine (1 of 3 - Risk 3-dose series) Never done    DTaP/Tdap/Td series (1 - Tdap) Never done    Cervical cancer screen  Never done    COVID-19 Vaccine (4 - Booster for Moderna series) 02/03/2022    Flu Vaccine (1) Never done         Coordination of Care Questionnaire:  :   1) Have you been to an emergency room, urgent care, or hospitalized since your last visit? If yes, where when, and reason for visit? no       2. Have seen or consulted any other health care provider since your last visit? If yes, where when, and reason for visit? NO      Patient is accompanied by self I have received verbal consent from Eufemia Bui to discuss any/all medical information while they are present in the room.

## 2022-11-28 NOTE — PROGRESS NOTES
HPI:  39 y.o.  presents for follow up appointment. No hospital, ER or specialist visits since last primary care visit except as noted below.     Last visit 9/1/22    New onset diabetes  Diagnosed 6/24/22 with A1c 10.6% in setting of polyuria and polydipsia  Current A1c 6.9% on 9/22/22;  8.3% on 8/11/2022  Current therapy: Metformin XR 1000 mg at night, glimepiride 4 mg each morning, Rybelsus 3 mg each morning  She is now on ARB and statin  Positive microalbuminuria with most recent labs  FBS 140s before she was sick with Covid, and then has been 170s since having Covid  She has been traveling and difficult sticking to her diet as well  She completed Paxlovid but did not need steroids; she did feel like she had a relapse after completing the Paxlovid    Hypertension  Current therapy: Valsartan 160 mg and HCTZ 25 mg daily  Reports home readings 120s/70s    Hypercholesterolemia with hypertriglyceridemia  Now on atorvastatin 40 mg since 8/11/2022 with good tolerance  Direct  in 6/24/22    Elevated liver enzymes  She reports h/o fatty liver since her 25s  Hep C Ab negative  No RUQ pain, N/V, jaundice  Were elevated in setting of new onset DM, normal on last check 9/22/22    Patient Active Problem List    Diagnosis    Sleep apnea     diagnosed in [facility] 2016, repeat study after tonstilectomy showed improvement      Hypertension     came off meds in 2011 after losing 100 lbs      Hyperlipemia     came off meds in 2011 after losing 100 lbs           Past Medical History:   Diagnosis Date    Hyperlipemia     came off meds in 2011 after losing 100 lbs    Hypertension     came off meds in 2011 after losing 100 lbs    Sleep apnea     diagnosed in [facility] 2016, repeat study after tonstilectomy showed improvement       Social History     Tobacco Use    Smoking status: Never    Smokeless tobacco: Never   Vaping Use    Vaping Use: Never used   Substance Use Topics    Alcohol use: Yes     Comment: occasionally    Drug use: Never       Outpatient Medications Marked as Taking for the 11/28/22 encounter (Office Visit) with Genoveva Hernández PA-C   Medication Sig Dispense Refill    nirmatrelvir-ritonavir (PAXLOVID) 300 mg (150 mg x 2)-100 mg Take nirmatrelvir 300 mg (150 mg pill x 2) and ritonavir 100 mg po BID per dose pack 1 Box 0    OTHER Please administer 2nd Covid Booster, due to high risk medical conditions (Dx E11.9, I10) and international travel 1 Each 0    semaglutide (Rybelsus) 3 mg tablet Take 1 Tablet by mouth Daily (before breakfast). 90 Tablet 1    atorvastatin (LIPITOR) 40 mg tablet Take 1 Tablet by mouth in the morning. 90 Tablet 1    hydroCHLOROthiazide (HYDRODIURIL) 25 mg tablet Take 1 Tablet by mouth in the morning. 30 Tablet 5    OneTouch Ultra2 Meter misc USE TO CHECK BLOOD SUGAR 1 Each 0    metFORMIN ER (GLUCOPHAGE XR) 500 mg tablet Take 1 tab po with dinner x 1 wk, then increase to 2 tabs po with dinner and stay at this dose 180 Tablet 1    glimepiride (AMARYL) 2 mg tablet Take 1 tab po with breakfast x 1 wk, then increase to 2 tabs po with breakfast and stay at this dose 180 Tablet 1    terconazole (TERAZOL 7) 0.4 % vaginal cream Insert 1 Applicator into vagina nightly. 45 g 0    glucose blood VI test strips (ASCENSIA AUTODISC VI, ONE TOUCH ULTRA TEST VI) strip Use to check blood sugar once daily, Please fill brand preferred by insurance, Dx E11.9 100 Strip 3    lancets misc Use to check blood sugar once daily, Please fill brand preferred by insurance, Dx E11.9 100 Each 3    valsartan (DIOVAN) 160 mg tablet Take 1 Tablet by mouth daily. 90 Tablet 1       No Known Allergies    ROS:  ROS negative except as per HPI.       PE:  Visit Vitals  BP (!) 143/89 (BP 1 Location: Left upper arm, BP Patient Position: Sitting, BP Cuff Size: Adult long)   Pulse 85   Temp 98.8 °F (37.1 °C) (Oral)   Resp 16   Ht 5' 7\" (1.702 m)   Wt 286 lb 3.2 oz (129.8 kg)   SpO2 98%   BMI 44.83 kg/m²     Gen: alert, oriented, no acute distress  Head: normocephalic, atraumatic  Eyes: pupils equal round reactive to light, sclera clear, conjunctiva clear  Neck: symmetric normal sized thyroid, no lymphadenopathy  Resp: no increase work of breathing, lungs clear to ausculation bilaterally, no wheezing, rales or rhonchi  CV: S1, S2 normal.  No murmurs, rubs, or gallops. Abd: soft, not tender, not distended. Normal bowel sounds. Neuro: grossly intact  Skin: no lesion or rash  Extremities: no cyanosis or edema    No results found for this visit on 11/28/22. Results for orders placed or performed in visit on 09/22/22   HEMOGLOBIN A1C WITH EAG   Result Value Ref Range    Hemoglobin A1c 6.9 (H) 4.0 - 5.6 %    Est. average glucose 777 mg/dL   METABOLIC PANEL, COMPREHENSIVE   Result Value Ref Range    Sodium 139 136 - 145 mmol/L    Potassium 4.2 3.5 - 5.1 mmol/L    Chloride 106 97 - 108 mmol/L    CO2 29 21 - 32 mmol/L    Anion gap 4 (L) 5 - 15 mmol/L    Glucose 158 (H) 65 - 100 mg/dL    BUN 13 6 - 20 MG/DL    Creatinine 0.55 0.55 - 1.02 MG/DL    BUN/Creatinine ratio 24 (H) 12 - 20      GFR est AA >60 >60 ml/min/1.73m2    GFR est non-AA >60 >60 ml/min/1.73m2    Calcium 9.8 8.5 - 10.1 MG/DL    Bilirubin, total 0.3 0.2 - 1.0 MG/DL    ALT (SGPT) 54 12 - 78 U/L    AST (SGOT) 22 15 - 37 U/L    Alk.  phosphatase 108 45 - 117 U/L    Protein, total 6.9 6.4 - 8.2 g/dL    Albumin 3.7 3.5 - 5.0 g/dL    Globulin 3.2 2.0 - 4.0 g/dL    A-G Ratio 1.2 1.1 - 2.2           Assessment/Plan:      ICD-10-CM ICD-9-CM    1. New onset type 2 diabetes mellitus (HCC)  E11.9 250.00 LIPID PANEL      METABOLIC PANEL, COMPREHENSIVE      HEMOGLOBIN A1C WITH EAG      MICROALBUMIN, UR, RAND W/ MICROALB/CREAT RATIO      valsartan-hydroCHLOROthiazide (DIOVAN-HCT) 160-25 mg per tablet      glimepiride (AMARYL) 2 mg tablet      metFORMIN ER (GLUCOPHAGE XR) 500 mg tablet      MICROALBUMIN, UR, RAND W/ MICROALB/CREAT RATIO      HEMOGLOBIN A1C WITH EAG      METABOLIC PANEL, COMPREHENSIVE      LIPID PANEL 2. Essential hypertension  M53 209.0 METABOLIC PANEL, COMPREHENSIVE      valsartan-hydroCHLOROthiazide (DIOVAN-HCT) 160-25 mg per tablet      METABOLIC PANEL, COMPREHENSIVE      3. Hypercholesterolemia with hypertriglyceridemia  E78.2 272.2 LIPID PANEL      METABOLIC PANEL, COMPREHENSIVE      METABOLIC PANEL, COMPREHENSIVE      LIPID PANEL      4. Elevated liver enzymes  L83.9 763.8 METABOLIC PANEL, COMPREHENSIVE      METABOLIC PANEL, COMPREHENSIVE      5. Microalbuminuria  R80.9 791.0 MICROALBUMIN, UR, RAND W/ MICROALB/CREAT RATIO      valsartan-hydroCHLOROthiazide (DIOVAN-HCT) 160-25 mg per tablet      MICROALBUMIN, UR, RAND W/ MICROALB/CREAT RATIO        1. Diabetes  Diagnosed 6/24/2022  Current therapy: Metformin XR 1000 mg at night, glimepiride 4 mg each morning, Rybelsus 3 mg each morning (8/11/2022)  A1c 6.9% on 9/22/2022  She reports having recent COVID infection and blood sugars have been elevated since then  She did not take steroids for her infection  We will check A1c today and if rising will increase Rybelsus to 7 mg. Also I had limited metformin due to loose stools/diarrhea, but she states it does not seem to be increased over her baseline at this time, and will also consider adding a third pill of metformin and perhaps lowering the glimepiride if needed, will make recommendations after labs are in  She has a goal of coming off therapy and asked if any medications could be reduced today. With rising blood sugars on her reports at home, I anticipate the need to increase medication. Reviewed diet and exercise. She is seeing a nutritionist.  Reviewed Althea Gell may also aid with weight loss.     2.  Hypertension  On valsartan 160 mg and HCTZ 25 mg  She has home blood pressure machine and home readings in the 120s over 70s  Blood pressure 143/89 today, she reports whitecoat hypertension  Continue to monitor at home  Will change her separate pills into the combination pill of valsartan/HCTZ 160/25    3. Hypercholesterolemia with hypertriglyceridemia  On atorvastatin 40 mg since 8/11/2022 with good tolerance  We will check fasting lipids today make further recommendations    4. Elevated liver enzymes  She is noted this since her 25s in association with fatty liver  Were most recently elevated in setting of new onset diabetes in June, but normal on last check  We will continue to monitor today    5. Microalbuminuria noted on last labs in setting of recently diagnosed diabetes  Will monitor, as this could be improved with improving diabetes control and now on valsartan        Health Maintenance reviewed - updated. Orders Placed This Encounter    LIPID PANEL     Standing Status:   Future     Number of Occurrences:   1     Standing Expiration Date:   56/31/4239    METABOLIC PANEL, COMPREHENSIVE     Standing Status:   Future     Number of Occurrences:   1     Standing Expiration Date:   11/28/2023    HEMOGLOBIN A1C WITH EAG     Standing Status:   Future     Number of Occurrences:   1     Standing Expiration Date:   11/28/2023    MICROALBUMIN, UR, RAND W/ MICROALB/CREAT RATIO     Standing Status:   Future     Number of Occurrences:   1     Standing Expiration Date:   11/28/2023    valsartan-hydroCHLOROthiazide (DIOVAN-HCT) 160-25 mg per tablet     Sig: Take 1 Tablet by mouth daily. (NOTICE COMBINATION PILL TO REPLACE THE INDIVIDUAL MEDICINES)     Dispense:  90 Tablet     Refill:  1    glimepiride (AMARYL) 2 mg tablet     Sig: Take 2 Tablets by mouth daily (with breakfast). Dispense:  180 Tablet     Refill:  1    metFORMIN ER (GLUCOPHAGE XR) 500 mg tablet     Sig: Take 2 Tablets by mouth daily (with dinner).      Dispense:  180 Tablet     Refill:  1       Medications Discontinued During This Encounter   Medication Reason    valsartan (DIOVAN) 160 mg tablet Other    hydroCHLOROthiazide (HYDRODIURIL) 25 mg tablet Other    metFORMIN ER (GLUCOPHAGE XR) 500 mg tablet REORDER    glimepiride (AMARYL) 2 mg tablet REORDER       Current Outpatient Medications   Medication Sig Dispense Refill    valsartan-hydroCHLOROthiazide (DIOVAN-HCT) 160-25 mg per tablet Take 1 Tablet by mouth daily. (NOTICE COMBINATION PILL TO REPLACE THE INDIVIDUAL MEDICINES) 90 Tablet 1    glimepiride (AMARYL) 2 mg tablet Take 2 Tablets by mouth daily (with breakfast). 180 Tablet 1    metFORMIN ER (GLUCOPHAGE XR) 500 mg tablet Take 2 Tablets by mouth daily (with dinner). 180 Tablet 1    nirmatrelvir-ritonavir (PAXLOVID) 300 mg (150 mg x 2)-100 mg Take nirmatrelvir 300 mg (150 mg pill x 2) and ritonavir 100 mg po BID per dose pack 1 Box 0    OTHER Please administer 2nd Covid Booster, due to high risk medical conditions (Dx E11.9, I10) and international travel 1 Each 0    semaglutide (Rybelsus) 3 mg tablet Take 1 Tablet by mouth Daily (before breakfast). 90 Tablet 1    atorvastatin (LIPITOR) 40 mg tablet Take 1 Tablet by mouth in the morning. 90 Tablet 1    OneTouch Ultra2 Meter misc USE TO CHECK BLOOD SUGAR 1 Each 0    terconazole (TERAZOL 7) 0.4 % vaginal cream Insert 1 Applicator into vagina nightly. 45 g 0    glucose blood VI test strips (ASCENSIA AUTODISC VI, ONE TOUCH ULTRA TEST VI) strip Use to check blood sugar once daily, Please fill brand preferred by insurance, Dx E11.9 100 Strip 3    lancets misc Use to check blood sugar once daily, Please fill brand preferred by insurance, Dx E11.9 100 Each 3       Recommended healthy diet low in carbohydrates, fats, sodium and cholesterol. Recommended regular cardiovascular exercise 3-6 times per week for 30-60 minutes daily. Verbal and written instructions (see AVS) provided. Patient expresses understanding of diagnosis and treatment plan. Follow-up and Dispositions    Return in about 3 months (around 2/28/2023) for DM, HTN.

## 2022-11-29 LAB
ALBUMIN SERPL-MCNC: 4 G/DL (ref 3.5–5)
ALBUMIN/GLOB SERPL: 1.1 {RATIO} (ref 1.1–2.2)
ALP SERPL-CCNC: 110 U/L (ref 45–117)
ALT SERPL-CCNC: 66 U/L (ref 12–78)
ANION GAP SERPL CALC-SCNC: 6 MMOL/L (ref 5–15)
AST SERPL-CCNC: 34 U/L (ref 15–37)
BILIRUB SERPL-MCNC: 0.3 MG/DL (ref 0.2–1)
BUN SERPL-MCNC: 11 MG/DL (ref 6–20)
BUN/CREAT SERPL: 22 (ref 12–20)
CALCIUM SERPL-MCNC: 10.1 MG/DL (ref 8.5–10.1)
CHLORIDE SERPL-SCNC: 104 MMOL/L (ref 97–108)
CHOLEST SERPL-MCNC: 226 MG/DL
CO2 SERPL-SCNC: 29 MMOL/L (ref 21–32)
CREAT SERPL-MCNC: 0.5 MG/DL (ref 0.55–1.02)
CREAT UR-MCNC: 71.3 MG/DL
EST. AVERAGE GLUCOSE BLD GHB EST-MCNC: 140 MG/DL
GLOBULIN SER CALC-MCNC: 3.5 G/DL (ref 2–4)
GLUCOSE SERPL-MCNC: 93 MG/DL (ref 65–100)
HBA1C MFR BLD: 6.5 % (ref 4–5.6)
HDLC SERPL-MCNC: 49 MG/DL
HDLC SERPL: 4.6 {RATIO} (ref 0–5)
LDLC SERPL CALC-MCNC: 120.2 MG/DL (ref 0–100)
MICROALBUMIN UR-MCNC: 25.9 MG/DL
MICROALBUMIN/CREAT UR-RTO: 363 MG/G (ref 0–30)
POTASSIUM SERPL-SCNC: 4.4 MMOL/L (ref 3.5–5.1)
PROT SERPL-MCNC: 7.5 G/DL (ref 6.4–8.2)
SODIUM SERPL-SCNC: 139 MMOL/L (ref 136–145)
TRIGL SERPL-MCNC: 284 MG/DL (ref ?–150)
VLDLC SERPL CALC-MCNC: 56.8 MG/DL

## 2023-01-05 NOTE — PROGRESS NOTES
Alejandra msg sent  The diabetes control looks great with A1C 6.5%, and the protein in the urine is decreasing with improving diabetes control and the valsartan. Continue same diabetes and blood pressure medicines. The cholesterol is improved. But the LDL remains elevated over our goal of 70 with diabetes. I recommend increasing the atorvastatin to 80 mg, but this would need to be monitored. If you would like to follow up with the nurse practitioner in our office or have another PCP appointment soon, I can go ahead and send in the new dose of atorvastatin. Let me know. The remainder of the labs look good.

## 2023-02-07 ENCOUNTER — TELEPHONE (OUTPATIENT)
Dept: INTERNAL MEDICINE CLINIC | Age: 43
End: 2023-02-07

## 2023-02-07 DIAGNOSIS — E11.9 NEW ONSET TYPE 2 DIABETES MELLITUS (HCC): ICD-10-CM

## 2023-02-07 DIAGNOSIS — E78.2 HYPERCHOLESTEROLEMIA WITH HYPERTRIGLYCERIDEMIA: ICD-10-CM

## 2023-02-07 NOTE — TELEPHONE ENCOUNTER
Pt states that her insurance no longer works with [facility] and she needs meds sent to [facility]:  Requested Prescriptions     Pending Prescriptions Disp Refills    semaglutide (Rybelsus) 3 mg tablet 90 Tablet 0     Sig: Take 1 Tablet by mouth Daily (before breakfast). atorvastatin (LIPITOR) 40 mg tablet 90 Tablet 0     Sig: Take 1 Tablet by mouth daily.

## 2023-02-07 NOTE — TELEPHONE ENCOUNTER
----- Message from Haile Stephens sent at 2/7/2023  9:09 AM EST -----  Subject: Appointment Request    Reason for Call: New Patient/New to Provider Appointment needed: Routine   Existing Condition Follow Up (Diabetes)    QUESTIONS    Reason for appointment request? No appointments available during search     Additional Information for Provider? Pt was attempting to r/s an appt from   2/24 with Desirae, but was disconnected.  [place] appts available in [place] ([place] [employer] search.   ---------------------------------------------------------------------------  --------------  Andrzej VILLARREAL  [phone]; OK to leave message on voicemail,OK to respond with electronic   message via [employer] portal (only for patients who have registered [employer]   account)  ---------------------------------------------------------------------------  --------------  SCRIPT ANSWERS  COVID Screen: Lex Wade

## 2023-02-07 NOTE — TELEPHONE ENCOUNTER
----- Message from Kevin Hilton sent at 2/7/2023  9:12 AM EST -----  Subject: Refill Request    QUESTIONS  Name of Medication? atorvastatin (LIPITOR) 40 mg tablet  Patient-reported dosage and instructions? 40 mg, once per day  How many days do you have left? 0  Preferred Pharmacy? [facility] phone number (if available)? [phone]  Additional Information for Provider? Pt states that her insurance no   longer works with [facility] and she needs meds sent to [facility]  ---------------------------------------------------------------------------  --------------,  Name of Medication? semaglutide (Rybelsus) 3 mg tablet  Patient-reported dosage and instructions? once per day  How many days do you have left? Unknown  Preferred Pharmacy? [address] [facility] phone number (if available)? [phone]  ---------------------------------------------------------------------------  --------------  Hanh MALIK  What is the best way for the office to contact you? OK to leave message on   [employer],OK to respond with electronic message via [employer] portal (only   for patients who have registered [employer] account)  Preferred Call Back Phone Number? [phone]  ---------------------------------------------------------------------------  --------------  SCRIPT ANSWERS  Relationship to Patient?  Self

## 2023-02-08 RX ORDER — ATORVASTATIN CALCIUM 40 MG/1
40 TABLET, FILM COATED ORAL DAILY
Qty: 90 TABLET | Refills: 0 | Status: SHIPPED | OUTPATIENT
Start: 2023-02-08 | End: 2023-02-08 | Stop reason: SDUPTHER

## 2023-03-31 ENCOUNTER — OFFICE VISIT (OUTPATIENT)
Dept: INTERNAL MEDICINE CLINIC | Age: 43
End: 2023-03-31

## 2023-03-31 VITALS
DIASTOLIC BLOOD PRESSURE: 90 MMHG | SYSTOLIC BLOOD PRESSURE: 160 MMHG | HEART RATE: 90 BPM | TEMPERATURE: 98.9 F | BODY MASS INDEX: 45.89 KG/M2 | HEIGHT: 67 IN | RESPIRATION RATE: 16 BRPM | OXYGEN SATURATION: 97 % | WEIGHT: 292.4 LBS

## 2023-03-31 DIAGNOSIS — E11.9 NEW ONSET TYPE 2 DIABETES MELLITUS (HCC): ICD-10-CM

## 2023-03-31 DIAGNOSIS — G47.30 SLEEP APNEA, UNSPECIFIED TYPE: ICD-10-CM

## 2023-03-31 DIAGNOSIS — I10 PRIMARY HYPERTENSION: Primary | ICD-10-CM

## 2023-03-31 DIAGNOSIS — Z12.31 SCREENING MAMMOGRAM FOR BREAST CANCER: ICD-10-CM

## 2023-03-31 DIAGNOSIS — E78.2 MIXED HYPERLIPIDEMIA: ICD-10-CM

## 2023-03-31 DIAGNOSIS — I10 ESSENTIAL HYPERTENSION: ICD-10-CM

## 2023-03-31 DIAGNOSIS — E11.00 TYPE 2 DIABETES MELLITUS WITH HYPEROSMOLARITY WITHOUT COMA, WITHOUT LONG-TERM CURRENT USE OF INSULIN (HCC): ICD-10-CM

## 2023-03-31 DIAGNOSIS — R80.9 MICROALBUMINURIA: ICD-10-CM

## 2023-03-31 RX ORDER — VALSARTAN AND HYDROCHLOROTHIAZIDE 160; 25 MG/1; MG/1
1 TABLET ORAL DAILY
Qty: 90 TABLET | Refills: 1 | Status: SHIPPED | OUTPATIENT
Start: 2023-03-31

## 2023-03-31 RX ORDER — PHENTERMINE HYDROCHLORIDE 37.5 MG/1
37.5 TABLET ORAL
Qty: 30 TABLET | Refills: 0 | Status: SHIPPED | OUTPATIENT
Start: 2023-03-31

## 2023-03-31 NOTE — PROGRESS NOTES
Tila Mota (: 1980) is a 43 y.o. female here for evaluation of the following chief complaint(s):  Establish Care         SUBJECTIVE/OBJECTIVE:  HPI    Ms. Giovanni Silva, 70-year-old female, here today to establish with new provider at office. She has concerns of weight gain and elevated blood sugars over the past couple months. She denies chest pain, heart palpitations, lower extremity edema, dyspnea. No abdominal pain or change in bowel habits. No Known Allergies    Current Outpatient Medications   Medication Sig Dispense Refill    semaglutide (Rybelsus) 7 mg tablet Take 1 Tablet by mouth Daily (before breakfast). 90 Tablet 0    valsartan-hydroCHLOROthiazide (DIOVAN-HCT) 160-25 mg per tablet Take 1 Tablet by mouth daily. (NOTICE COMBINATION PILL TO REPLACE THE INDIVIDUAL MEDICINES) 90 Tablet 1    phentermine (ADIPEX-P) 37.5 mg tablet Take 1 Tablet by mouth every morning. Max Daily Amount: 37.5 mg. 30 Tablet 0    atorvastatin (LIPITOR) 40 mg tablet Take 1 Tablet by mouth daily. 90 Tablet 0    glimepiride (AMARYL) 2 mg tablet Take 2 Tablets by mouth daily (with breakfast). 180 Tablet 1    metFORMIN ER (GLUCOPHAGE XR) 500 mg tablet Take 2 Tablets by mouth daily (with dinner). 180 Tablet 1    OneTouch Ultra2 Meter misc USE TO CHECK BLOOD SUGAR 1 Each 0    terconazole (TERAZOL 7) 0.4 % vaginal cream Insert 1 Applicator into vagina nightly.  45 g 0    glucose blood VI test strips (ASCENSIA AUTODISC VI, ONE TOUCH ULTRA TEST VI) strip Use to check blood sugar once daily, Please fill brand preferred by insurance, Dx E11.9 100 Strip 3    lancets misc Use to check blood sugar once daily, Please fill brand preferred by insurance, Dx E11.9 100 Each 3    OTHER Please administer 2nd Covid Booster, due to high risk medical conditions (Dx E11.9, I10) and international travel 1 Each 0        Past Medical History:   Diagnosis Date    Hyperlipemia     came off meds in  after losing 100 lbs    Hypertension     came off meds in 2011 after losing 100 lbs    Sleep apnea     diagnosed in [facility] 2016, repeat study after tonstilectomy showed improvement     Past Surgical History:   Procedure Laterality Date    HX ACL RECONSTRUCTION Right 1999    HX BREAST REDUCTION  2002    HX MENISCUS REPAIR Right 1999    HX TONSILLECTOMY Bilateral 07/2019    [facility], Dr Margaret Davalos History     Socioeconomic History    Marital status: UNKNOWN     Spouse name: Not on file    Number of children: Not on file    Years of education: Not on file    Highest education level: Not on file   Occupational History    Not on file   Tobacco Use    Smoking status: Never    Smokeless tobacco: Never   Vaping Use    Vaping Use: Never used   Substance and Sexual Activity    Alcohol use: Yes     Comment: occasionally    Drug use: Never    Sexual activity: Yes   Other Topics Concern    Not on file   Social History Narrative    Not on file     Social Determinants of Health     Financial Resource Strain: Not on file   Food Insecurity: Not on file   Transportation Needs: Not on file   Physical Activity: Not on file   Stress: Not on file   Social Connections: Not on file   Intimate Partner Violence: Not on file   Housing Stability: Not on file      Family History   Problem Relation Age of Onset    No Known Problems Mother     Heart Disease Father     Prostate Cancer Father     Hypertension Brother     Diabetes Paternal Grandfather        Review of Systems   Constitutional:  Positive for unexpected weight change. Negative for activity change, appetite change, diaphoresis, fatigue and fever. HENT:  Negative for congestion, ear pain, facial swelling, hearing loss, postnasal drip, rhinorrhea, sinus pressure, sinus pain, sneezing, sore throat, tinnitus, trouble swallowing and voice change. Eyes:  Negative for photophobia, pain, redness and visual disturbance. Respiratory:  Negative for apnea, cough, chest tightness, shortness of breath and wheezing. Cardiovascular:  Negative for chest pain, palpitations and leg swelling. Gastrointestinal:  Negative for abdominal distention, abdominal pain, blood in stool, constipation, diarrhea, nausea, rectal pain and vomiting. Endocrine: Negative for cold intolerance, heat intolerance, polydipsia, polyphagia and polyuria. Genitourinary:  Negative for decreased urine volume, difficulty urinating, dyspareunia, dysuria, flank pain, frequency, hematuria, menstrual problem, pelvic pain, urgency, vaginal bleeding, vaginal discharge and vaginal pain. Musculoskeletal:  Negative for arthralgias, back pain, joint swelling, myalgias, neck pain and neck stiffness. Skin:  Negative for color change and rash. Allergic/Immunologic: Negative for environmental allergies and food allergies. Neurological:  Negative for dizziness, tremors, syncope, weakness, light-headedness, numbness and headaches. Hematological:  Negative for adenopathy. Does not bruise/bleed easily. Psychiatric/Behavioral:  Negative for confusion, hallucinations, self-injury, sleep disturbance and suicidal ideas. The patient is not nervous/anxious. BP (!) 168/96 (BP 1 Location: Left upper arm, BP Patient Position: Sitting, BP Cuff Size: Large adult)   Pulse 90   Temp 98.9 °F (37.2 °C) (Oral)   Resp 16   Ht 5' 7\" (1.702 m)   Wt 292 lb 6.4 oz (132.6 kg)   LMP 03/21/2023 (Exact Date)   SpO2 97%   BMI 45.80 kg/m²    Patient's last menstrual period was 03/21/2023 (exact date). Physical Exam  Constitutional:       Appearance: Normal appearance. She is obese. HENT:      Head: Normocephalic and atraumatic. Nose: Nose normal.      Mouth/Throat:      Mouth: Mucous membranes are moist.      Pharynx: Oropharynx is clear. Eyes:      Extraocular Movements: Extraocular movements intact. Conjunctiva/sclera: Conjunctivae normal.      Pupils: Pupils are equal, round, and reactive to light.    Cardiovascular:      Rate and Rhythm: Normal rate and regular rhythm. Pulses: Normal pulses. Heart sounds: Normal heart sounds. Pulmonary:      Effort: Pulmonary effort is normal.      Breath sounds: Normal breath sounds. Abdominal:      General: Abdomen is flat. Bowel sounds are normal.      Palpations: Abdomen is soft. Musculoskeletal:         General: Normal range of motion. Cervical back: Normal range of motion and neck supple. Skin:     General: Skin is warm and dry. Capillary Refill: Capillary refill takes less than 2 seconds. Neurological:      General: No focal deficit present. Mental Status: She is alert and oriented to person, place, and time. Mental status is at baseline. Psychiatric:         Mood and Affect: Mood normal.         Behavior: Behavior normal.         Thought Content: Thought content normal.         Judgment: Judgment normal.       ASSESSMENT/PLAN:  Below is the assessment and plan developed based on review of pertinent history, physical exam, labs, studies, and medications. 1. Primary hypertension  2. Mixed hyperlipidemia  3. Type 2 diabetes mellitus with hyperosmolarity without coma, without long-term current use of insulin (HCC)  -     CBC WITH AUTOMATED DIFF; Future  -     LIPID PANEL; Future  -     METABOLIC PANEL, COMPREHENSIVE; Future  -     TSH 3RD GENERATION; Future  -     URINALYSIS W/ RFLX MICROSCOPIC; Future  -     HEMOGLOBIN A1C WITH EAG; Future  -     MICROALBUMIN, UR, RAND W/ MICROALB/CREAT RATIO; Future  4. Body mass index (BMI) 45.0-49.9, adult ([facility])  -     phentermine (ADIPEX-P) 37.5 mg tablet; Take 1 Tablet by mouth every morning. Max Daily Amount: 37.5 mg., Normal, Disp-30 Tablet, R-0  5. Sleep apnea, unspecified type  6. New onset type 2 diabetes mellitus ([facility])  -     valsartan-hydroCHLOROthiazide (DIOVAN-HCT) 160-25 mg per tablet; Take 1 Tablet by mouth daily. (NOTICE COMBINATION PILL TO REPLACE THE INDIVIDUAL MEDICINES), Normal, Disp-90 Tablet, R-1  7.  Essential hypertension  - valsartan-hydroCHLOROthiazide (DIOVAN-HCT) 160-25 mg per tablet; Take 1 Tablet by mouth daily. (NOTICE COMBINATION PILL TO REPLACE THE INDIVIDUAL MEDICINES), Normal, Disp-90 Tablet, R-1  8. Microalbuminuria  -     valsartan-hydroCHLOROthiazide (DIOVAN-HCT) 160-25 mg per tablet; Take 1 Tablet by mouth daily. (NOTICE COMBINATION PILL TO REPLACE THE INDIVIDUAL MEDICINES), Normal, Disp-90 Tablet, R-1  9. Screening mammogram for breast cancer  -     [facility] MAMMO BI SCREENING INCL CAD; Future    1.  BP not controlled but she does state that at home it is 120s over 80s. She will bring in her blood pressure cuff to next office visit to compare numbers. She does report that her blood pressure is always elevated in doctor's office. Currently taking valsartan 160 hydrochlorothiazide 25 mg. Continue to monitor    2. Currently taking Lipitor 40 mg daily. Previous provider was discussing starting her on 80 mg daily we will hold off on this and for today's fasting lab work results. 3.  Was started on semaglutide Rybelsus 3 mg approximately 3 months ago. She does not want to rely on once weekly injections due to frequent traveling on airplanes with her job. Currently taking glimepiride 4 mg daily as well as metformin 1000 mg daily. Will increase Rybelsus to 7 mg daily. To get full effect of semaglutide's weight loss as well as glucose control results. 4.  She admits to not exercising as well as not having most healthy diet. She travels frequently for her job. .  We will do trial of phentermine 37.5 mg daily. Reviewed med admin/risk/benefits/side effects. We discussed exercise routines, healthy habits, weight loss goal is 5 pounds in 1 month. She declines referral at this time to nutritionist.    5.  She had her tonsils removed which resolved her sleep apnea she. She reports last sleep study did not show sleep apnea, but she continues to use CPAP machine because it does improve her sleeping.     8.  Continue with valsartan hydrochlorothiazide. As well as strict glucose control. Anticipate results of today's fasting lab work. Return to office in 1 month for weight check and to review today's lab work. Sooner if needed. We will need to sign controlled substance agreement. No results found for this visit on 03/31/23. No follow-ups on file. An electronic signature was used to authenticate this note.   -- ALY Tucker

## 2023-03-31 NOTE — PROGRESS NOTES
Chief Complaint   Patient presents with    Establish Care     Visit Vitals  BP (!) 168/96 (BP 1 Location: Left upper arm, BP Patient Position: Sitting, BP Cuff Size: Large adult)   Pulse 90   Temp 98.9 °F (37.2 °C) (Oral)   Resp 16   Ht 5' 7\" (1.702 m)   Wt 292 lb 6.4 oz (132.6 kg)   LMP 03/21/2023 (Exact Date)   SpO2 97%   BMI 45.80 kg/m²     1. Have you been to the ER, urgent care clinic since your last visit? Hospitalized since your last visit? No    2. Have you seen or consulted any other health care providers outside of the [address] [place] [address] since your last visit? Include any pap smears or colon screening.  No

## 2023-04-13 PROBLEM — R74.8 ELEVATED LIVER ENZYMES: Status: ACTIVE | Noted: 2023-04-13

## 2023-05-05 ENCOUNTER — OFFICE VISIT (OUTPATIENT)
Dept: INTERNAL MEDICINE CLINIC | Age: 43
End: 2023-05-05

## 2023-05-05 VITALS
TEMPERATURE: 98.3 F | WEIGHT: 285.8 LBS | BODY MASS INDEX: 44.86 KG/M2 | RESPIRATION RATE: 16 BRPM | HEART RATE: 92 BPM | HEIGHT: 67 IN | DIASTOLIC BLOOD PRESSURE: 96 MMHG | SYSTOLIC BLOOD PRESSURE: 152 MMHG | OXYGEN SATURATION: 98 %

## 2023-05-05 DIAGNOSIS — E11.00 TYPE 2 DIABETES MELLITUS WITH HYPEROSMOLARITY WITHOUT COMA, WITHOUT LONG-TERM CURRENT USE OF INSULIN (HCC): Primary | ICD-10-CM

## 2023-05-05 DIAGNOSIS — I10 PRIMARY HYPERTENSION: ICD-10-CM

## 2023-05-05 DIAGNOSIS — R74.8 ELEVATED LIVER ENZYMES: ICD-10-CM

## 2023-05-05 DIAGNOSIS — E78.2 MIXED HYPERLIPIDEMIA: ICD-10-CM

## 2023-05-05 LAB
ALBUMIN SERPL-MCNC: 4.2 G/DL (ref 3.5–5)
ALBUMIN/GLOB SERPL: 1.4 (ref 1.1–2.2)
ALP SERPL-CCNC: 110 U/L (ref 45–117)
ALT SERPL-CCNC: 80 U/L (ref 12–78)
ANION GAP SERPL CALC-SCNC: 6 MMOL/L (ref 5–15)
AST SERPL-CCNC: 55 U/L (ref 15–37)
BASOPHILS # BLD: 0 K/UL (ref 0–0.1)
BASOPHILS NFR BLD: 1 % (ref 0–1)
BILIRUB SERPL-MCNC: 0.4 MG/DL (ref 0.2–1)
BUN SERPL-MCNC: 14 MG/DL (ref 6–20)
BUN/CREAT SERPL: 23 (ref 12–20)
CALCIUM SERPL-MCNC: 9.8 MG/DL (ref 8.5–10.1)
CHLORIDE SERPL-SCNC: 105 MMOL/L (ref 97–108)
CHOLEST SERPL-MCNC: 134 MG/DL
CO2 SERPL-SCNC: 27 MMOL/L (ref 21–32)
CREAT SERPL-MCNC: 0.61 MG/DL (ref 0.55–1.02)
DIFFERENTIAL METHOD BLD: NORMAL
EOSINOPHIL # BLD: 0.2 K/UL (ref 0–0.4)
EOSINOPHIL NFR BLD: 2 % (ref 0–7)
ERYTHROCYTE [DISTWIDTH] IN BLOOD BY AUTOMATED COUNT: 12.9 % (ref 11.5–14.5)
GLOBULIN SER CALC-MCNC: 3 G/DL (ref 2–4)
GLUCOSE SERPL-MCNC: 151 MG/DL (ref 65–100)
HCT VFR BLD AUTO: 44.2 % (ref 35–47)
HDLC SERPL-MCNC: 38 MG/DL
HDLC SERPL: 3.5 (ref 0–5)
HGB BLD-MCNC: 14 G/DL (ref 11.5–16)
IMM GRANULOCYTES # BLD AUTO: 0 K/UL (ref 0–0.04)
IMM GRANULOCYTES NFR BLD AUTO: 0 % (ref 0–0.5)
LDLC SERPL CALC-MCNC: 60.4 MG/DL (ref 0–100)
LYMPHOCYTES # BLD: 2.3 K/UL (ref 0.8–3.5)
LYMPHOCYTES NFR BLD: 28 % (ref 12–49)
MCH RBC QN AUTO: 28.3 PG (ref 26–34)
MCHC RBC AUTO-ENTMCNC: 31.7 G/DL (ref 30–36.5)
MCV RBC AUTO: 89.5 FL (ref 80–99)
MONOCYTES # BLD: 0.5 K/UL (ref 0–1)
MONOCYTES NFR BLD: 6 % (ref 5–13)
NEUTS SEG # BLD: 5.2 K/UL (ref 1.8–8)
NEUTS SEG NFR BLD: 63 % (ref 32–75)
NRBC # BLD: 0 K/UL (ref 0–0.01)
NRBC BLD-RTO: 0 PER 100 WBC
PLATELET # BLD AUTO: 389 K/UL (ref 150–400)
PMV BLD AUTO: 8.9 FL (ref 8.9–12.9)
POTASSIUM SERPL-SCNC: 4.6 MMOL/L (ref 3.5–5.1)
PROT SERPL-MCNC: 7.2 G/DL (ref 6.4–8.2)
RBC # BLD AUTO: 4.94 M/UL (ref 3.8–5.2)
SODIUM SERPL-SCNC: 138 MMOL/L (ref 136–145)
TRIGL SERPL-MCNC: 178 MG/DL (ref ?–150)
VLDLC SERPL CALC-MCNC: 35.6 MG/DL
WBC # BLD AUTO: 8.2 K/UL (ref 3.6–11)

## 2023-05-10 RX ORDER — PHENTERMINE HYDROCHLORIDE 37.5 MG/1
TABLET ORAL
Qty: 30 TABLET | Refills: 2 | Status: SHIPPED | OUTPATIENT
Start: 2023-05-10 | End: 2023-05-12 | Stop reason: SDUPTHER

## 2023-05-11 RX ORDER — GLIMEPIRIDE 2 MG/1
TABLET ORAL
Qty: 180 TABLET | Refills: 0 | Status: SHIPPED | OUTPATIENT
Start: 2023-05-11

## 2023-05-12 RX ORDER — PHENTERMINE HYDROCHLORIDE 37.5 MG/1
37.5 TABLET ORAL EVERY MORNING
Qty: 90 TABLET | Refills: 0 | Status: SHIPPED | OUTPATIENT
Start: 2023-05-12 | End: 2023-06-11

## 2023-05-22 RX ORDER — SCOLOPAMINE TRANSDERMAL SYSTEM 1 MG/1
1 PATCH, EXTENDED RELEASE TRANSDERMAL
Qty: 5 PATCH | Refills: 1 | Status: SHIPPED | OUTPATIENT
Start: 2023-05-22

## 2023-05-22 RX ORDER — ONDANSETRON 4 MG/1
4 TABLET, FILM COATED ORAL 3 TIMES DAILY PRN
Qty: 42 TABLET | Refills: 0 | Status: SHIPPED | OUTPATIENT
Start: 2023-05-22

## 2023-06-05 NOTE — TELEPHONE ENCOUNTER
RX refill request from the patient/pharmacy. Patient last seen 5/05/2023 with labs, and next appt. scheduled for 9/13/2023.     Requested Prescriptions     Pending Prescriptions Disp Refills    metFORMIN (GLUCOPHAGE) 1000 MG tablet [Pharmacy Med Name: METFORMIN 1000 MG TAB] 60 tablet 1     Sig: TAKE ONE TABLET BY MOUTH TWICE A DAY WITH A MEAL

## 2023-07-10 RX ORDER — ORAL SEMAGLUTIDE 7 MG/1
TABLET ORAL
Qty: 90 TABLET | Refills: 0 | Status: SHIPPED | OUTPATIENT
Start: 2023-07-10

## 2023-07-10 NOTE — TELEPHONE ENCOUNTER
PCP: ANDREA Raymond NP    Last appt: 5/5/2023  Future Appointments   Date Time Provider [address]   9/13/2023  9:00 AM ANDREA Raymond NP [facility] AMB       Requested Prescriptions     Pending Prescriptions Disp Refills    RYBELSUS 7 MG TABS [Pharmacy Med Name: [place] 7 MG TAB[*^]] 90 tablet 0     Sig: TAKE ONE TABLET BY MOUTH EVERY MORNING BEFORE BREAKFAST       Prior labs and Blood pressures:  BP Readings from Last 3 Encounters:   05/05/23 (!) 152/96   03/31/23 (!) 160/90   11/28/22 (!) 143/89     Lab Results   Component Value Date/Time     05/05/2023 08:58 AM    K 4.6 05/05/2023 08:58 AM     05/05/2023 08:58 AM    CO2 27 05/05/2023 08:58 AM    BUN 14 05/05/2023 08:58 AM    GFRAA >60 09/22/2022 08:46 AM     No results found for: HBA1C, [id number]  Lab Results   Component Value Date/Time    CHOL 134 05/05/2023 08:58 AM    HDL 38 05/05/2023 08:58 AM     No results found for: VITD3, VD3RIA        Lab Results   Component Value Date/Time    TSH 3.00 06/24/2022 10:37 AM

## 2023-08-10 NOTE — TELEPHONE ENCOUNTER
PCP: ANDREA Raymond NP    Last appt: 5/5/2023  Future Appointments   Date Time Provider [address]   9/13/2023  9:00 AM ANDREA Raymond NP [facility] AMB       Requested Prescriptions     Pending Prescriptions Disp Refills    atorvastatin (LIPITOR) 80 MG tablet [Pharmacy Med Name: ATORVASTATIN 80 MG TAB[*]] 60 tablet 1     Sig: TAKE ONE TABLET BY MOUTH ONE TIME DAILY       Prior labs and Blood pressures:  BP Readings from Last 3 Encounters:   05/05/23 (!) 152/96   03/31/23 (!) 160/90   11/28/22 (!) 143/89     Lab Results   Component Value Date/Time     05/05/2023 08:58 AM    K 4.6 05/05/2023 08:58 AM     05/05/2023 08:58 AM    CO2 27 05/05/2023 08:58 AM    BUN 14 05/05/2023 08:58 AM    GFRAA >60 09/22/2022 08:46 AM     No results found for: HBA1C, [id number]  Lab Results   Component Value Date/Time    CHOL 134 05/05/2023 08:58 AM    HDL 38 05/05/2023 08:58 AM     No results found for: VITD3, VD3RIA        Lab Results   Component Value Date/Time    TSH 3.00 06/24/2022 10:37 AM

## 2023-08-11 RX ORDER — ATORVASTATIN CALCIUM 80 MG/1
TABLET, FILM COATED ORAL
Qty: 90 TABLET | Refills: 1 | Status: SHIPPED | OUTPATIENT
Start: 2023-08-11

## 2023-08-15 RX ORDER — GLIMEPIRIDE 2 MG/1
TABLET ORAL
Qty: 180 TABLET | Refills: 1 | Status: SHIPPED | OUTPATIENT
Start: 2023-08-15

## 2023-08-21 RX ORDER — HYDROCHLOROTHIAZIDE 25 MG/1
TABLET ORAL
Qty: 30 TABLET | Refills: 1 | Status: SHIPPED | OUTPATIENT
Start: 2023-08-21

## 2023-08-21 NOTE — TELEPHONE ENCOUNTER
Last Refill: 8-11-22  Last Visit: 5-5-23   Next Visit: 9/13/2023     Requested Prescriptions     Pending Prescriptions Disp Refills    hydroCHLOROthiazide (HYDRODIURIL) 25 MG tablet [Pharmacy Med Name: HYDROCHLOROTHIAZIDE 25 MG TAB] 30 tablet 1     Sig: TAKE ONE TABLET BY MOUTH EVERY MORNING

## 2023-09-10 SDOH — ECONOMIC STABILITY: FOOD INSECURITY: WITHIN THE PAST 12 MONTHS, THE FOOD YOU BOUGHT JUST DIDN'T LAST AND YOU DIDN'T HAVE MONEY TO GET MORE.: NEVER TRUE

## 2023-09-10 SDOH — ECONOMIC STABILITY: TRANSPORTATION INSECURITY
IN THE PAST 12 MONTHS, HAS LACK OF TRANSPORTATION KEPT YOU FROM MEETINGS, WORK, OR FROM GETTING THINGS NEEDED FOR DAILY LIVING?: NO

## 2023-09-10 SDOH — ECONOMIC STABILITY: HOUSING INSECURITY
IN THE LAST 12 MONTHS, WAS THERE A TIME WHEN YOU DID NOT HAVE A STEADY PLACE TO SLEEP OR SLEPT IN A SHELTER (INCLUDING NOW)?: NO

## 2023-09-10 SDOH — ECONOMIC STABILITY: INCOME INSECURITY: HOW HARD IS IT FOR YOU TO PAY FOR THE VERY BASICS LIKE FOOD, HOUSING, MEDICAL CARE, AND HEATING?: NOT HARD AT ALL

## 2023-09-10 SDOH — ECONOMIC STABILITY: FOOD INSECURITY: WITHIN THE PAST 12 MONTHS, YOU WORRIED THAT YOUR FOOD WOULD RUN OUT BEFORE YOU GOT MONEY TO BUY MORE.: NEVER TRUE

## 2023-09-13 ENCOUNTER — OFFICE VISIT (OUTPATIENT)
Facility: CLINIC | Age: 43
End: 2023-09-13
Payer: COMMERCIAL

## 2023-09-13 VITALS
RESPIRATION RATE: 17 BRPM | DIASTOLIC BLOOD PRESSURE: 80 MMHG | BODY MASS INDEX: 43.74 KG/M2 | TEMPERATURE: 98.6 F | HEIGHT: 67 IN | SYSTOLIC BLOOD PRESSURE: 130 MMHG | OXYGEN SATURATION: 98 % | HEART RATE: 79 BPM | WEIGHT: 278.7 LBS

## 2023-09-13 DIAGNOSIS — Z90.89 HISTORY OF UVULECTOMY: ICD-10-CM

## 2023-09-13 DIAGNOSIS — Z11.4 ENCOUNTER FOR SCREENING FOR HIV: ICD-10-CM

## 2023-09-13 DIAGNOSIS — Z71.85 IMMUNIZATION COUNSELING: ICD-10-CM

## 2023-09-13 DIAGNOSIS — I10 PRIMARY HYPERTENSION: ICD-10-CM

## 2023-09-13 DIAGNOSIS — E11.00 TYPE 2 DIABETES MELLITUS WITH HYPEROSMOLARITY WITHOUT COMA, WITHOUT LONG-TERM CURRENT USE OF INSULIN (HCC): Primary | ICD-10-CM

## 2023-09-13 DIAGNOSIS — T75.3XXD MOTION SICKNESS, SUBSEQUENT ENCOUNTER: ICD-10-CM

## 2023-09-13 DIAGNOSIS — E78.2 MIXED HYPERLIPIDEMIA: ICD-10-CM

## 2023-09-13 PROBLEM — T75.3XXA MOTION SICKNESS: Status: ACTIVE | Noted: 2023-09-13

## 2023-09-13 LAB
ALBUMIN SERPL-MCNC: 4.1 G/DL (ref 3.5–5)
ALBUMIN/GLOB SERPL: 1.3 (ref 1.1–2.2)
ALP SERPL-CCNC: 109 U/L (ref 45–117)
ALT SERPL-CCNC: 84 U/L (ref 12–78)
ANION GAP SERPL CALC-SCNC: 4 MMOL/L (ref 5–15)
APPEARANCE UR: ABNORMAL
AST SERPL-CCNC: 45 U/L (ref 15–37)
BACTERIA URNS QL MICRO: ABNORMAL /HPF
BASOPHILS # BLD: 0.1 K/UL (ref 0–0.1)
BASOPHILS NFR BLD: 1 % (ref 0–1)
BILIRUB SERPL-MCNC: 0.3 MG/DL (ref 0.2–1)
BILIRUB UR QL: NEGATIVE
BUN SERPL-MCNC: 12 MG/DL (ref 6–20)
BUN/CREAT SERPL: 20 (ref 12–20)
CALCIUM SERPL-MCNC: 10.1 MG/DL (ref 8.5–10.1)
CHLORIDE SERPL-SCNC: 104 MMOL/L (ref 97–108)
CHOLEST SERPL-MCNC: 149 MG/DL
CO2 SERPL-SCNC: 30 MMOL/L (ref 21–32)
COLOR UR: ABNORMAL
CREAT SERPL-MCNC: 0.6 MG/DL (ref 0.55–1.02)
CREAT UR-MCNC: 152 MG/DL
DIFFERENTIAL METHOD BLD: ABNORMAL
EOSINOPHIL # BLD: 0.2 K/UL (ref 0–0.4)
EOSINOPHIL NFR BLD: 2 % (ref 0–7)
EPITH CASTS URNS QL MICRO: ABNORMAL /LPF
ERYTHROCYTE [DISTWIDTH] IN BLOOD BY AUTOMATED COUNT: 13.2 % (ref 11.5–14.5)
EST. AVERAGE GLUCOSE BLD GHB EST-MCNC: 148 MG/DL
GLOBULIN SER CALC-MCNC: 3.1 G/DL (ref 2–4)
GLUCOSE SERPL-MCNC: 140 MG/DL (ref 65–100)
GLUCOSE UR STRIP.AUTO-MCNC: NEGATIVE MG/DL
HBA1C MFR BLD: 6.8 % (ref 4–5.6)
HCT VFR BLD AUTO: 42 % (ref 35–47)
HDLC SERPL-MCNC: 41 MG/DL
HDLC SERPL: 3.6 (ref 0–5)
HGB BLD-MCNC: 13.3 G/DL (ref 11.5–16)
HGB UR QL STRIP: NEGATIVE
HIV 1+2 AB+HIV1 P24 AG SERPL QL IA: NONREACTIVE
HIV 1/2 RESULT COMMENT: NORMAL
HYALINE CASTS URNS QL MICRO: ABNORMAL /LPF (ref 0–5)
IMM GRANULOCYTES # BLD AUTO: 0 K/UL (ref 0–0.04)
IMM GRANULOCYTES NFR BLD AUTO: 0 % (ref 0–0.5)
KETONES UR QL STRIP.AUTO: NEGATIVE MG/DL
LDLC SERPL CALC-MCNC: 72.6 MG/DL (ref 0–100)
LEUKOCYTE ESTERASE UR QL STRIP.AUTO: NEGATIVE
LYMPHOCYTES # BLD: 2.8 K/UL (ref 0.8–3.5)
LYMPHOCYTES NFR BLD: 27 % (ref 12–49)
MCH RBC QN AUTO: 28.5 PG (ref 26–34)
MCHC RBC AUTO-ENTMCNC: 31.7 G/DL (ref 30–36.5)
MCV RBC AUTO: 89.9 FL (ref 80–99)
MICROALBUMIN UR-MCNC: 14.2 MG/DL
MICROALBUMIN/CREAT UR-RTO: 93 MG/G (ref 0–30)
MONOCYTES # BLD: 0.6 K/UL (ref 0–1)
MONOCYTES NFR BLD: 6 % (ref 5–13)
NEUTS SEG # BLD: 6.6 K/UL (ref 1.8–8)
NEUTS SEG NFR BLD: 64 % (ref 32–75)
NITRITE UR QL STRIP.AUTO: NEGATIVE
NRBC # BLD: 0 K/UL (ref 0–0.01)
NRBC BLD-RTO: 0 PER 100 WBC
PH UR STRIP: 5.5 (ref 5–8)
PLATELET # BLD AUTO: 476 K/UL (ref 150–400)
PMV BLD AUTO: 8.8 FL (ref 8.9–12.9)
POTASSIUM SERPL-SCNC: 4 MMOL/L (ref 3.5–5.1)
PROT SERPL-MCNC: 7.2 G/DL (ref 6.4–8.2)
PROT UR STRIP-MCNC: 30 MG/DL
RBC # BLD AUTO: 4.67 M/UL (ref 3.8–5.2)
RBC #/AREA URNS HPF: ABNORMAL /HPF (ref 0–5)
SODIUM SERPL-SCNC: 138 MMOL/L (ref 136–145)
SP GR UR REFRACTOMETRY: 1.02 (ref 1–1.03)
TRIGL SERPL-MCNC: 177 MG/DL
URINE CULTURE IF INDICATED: ABNORMAL
UROBILINOGEN UR QL STRIP.AUTO: 0.2 EU/DL (ref 0.2–1)
VLDLC SERPL CALC-MCNC: 35.4 MG/DL
WBC # BLD AUTO: 10.3 K/UL (ref 3.6–11)
WBC URNS QL MICRO: ABNORMAL /HPF (ref 0–4)

## 2023-09-13 PROCEDURE — 99214 OFFICE O/P EST MOD 30 MIN: CPT | Performed by: NURSE PRACTITIONER

## 2023-09-13 PROCEDURE — 3079F DIAST BP 80-89 MM HG: CPT | Performed by: NURSE PRACTITIONER

## 2023-09-13 PROCEDURE — 3075F SYST BP GE 130 - 139MM HG: CPT | Performed by: NURSE PRACTITIONER

## 2023-09-13 ASSESSMENT — PATIENT HEALTH QUESTIONNAIRE - PHQ9
SUM OF ALL RESPONSES TO PHQ QUESTIONS 1-9: 0
2. FEELING DOWN, DEPRESSED OR HOPELESS: 0
SUM OF ALL RESPONSES TO PHQ9 QUESTIONS 1 & 2: 0
SUM OF ALL RESPONSES TO PHQ QUESTIONS 1-9: 0
1. LITTLE INTEREST OR PLEASURE IN DOING THINGS: 0
SUM OF ALL RESPONSES TO PHQ QUESTIONS 1-9: 0
SUM OF ALL RESPONSES TO PHQ QUESTIONS 1-9: 0

## 2023-09-13 ASSESSMENT — ENCOUNTER SYMPTOMS
PHOTOPHOBIA: 0
DIARRHEA: 0
ANAL BLEEDING: 0
CHOKING: 0
EYE PAIN: 0
RECTAL PAIN: 0
SINUS PAIN: 0
SHORTNESS OF BREATH: 0
BACK PAIN: 0
NAUSEA: 0
EYE DISCHARGE: 0
COUGH: 0
SORE THROAT: 0
TROUBLE SWALLOWING: 0
VOMITING: 0
ABDOMINAL PAIN: 0
WHEEZING: 0
SINUS PRESSURE: 0
BLOOD IN STOOL: 0
COLOR CHANGE: 0
EYE REDNESS: 0
APNEA: 0
CONSTIPATION: 0
FACIAL SWELLING: 0

## 2023-09-13 NOTE — PROGRESS NOTES
Chief Complaint   Patient presents with    Hypertension     3 month f/up    Diabetes    Hyperlipidemia     1. Have you been to the ER, urgent care clinic since your last visit? Hospitalized since your last visit? No    2. Have you seen or consulted any other health care providers outside of the [address] [place] [address] Avenue since your last visit? Include any pap smears or colon screening.  No

## 2023-09-13 NOTE — PROGRESS NOTES
Claudell Hones (: 1980) is a 43 y.o. female here for evaluation of the following chief complaint(s):  Hypertension (3 month f/up), Diabetes, and Hyperlipidemia         SUBJECTIVE/OBJECTIVE:  HPI      Ms. Es Coon, 42 yo female, here today for follow-up on T2DM. She was last seen May, 2023. She has no new concerns today. Medication reconciled. She denies chest pain, heart palpitations, lower extremity edema, dyspnea. No Known Allergies    Current Outpatient Medications   Medication Sig Dispense Refill    glimepiride (AMARYL) 2 MG tablet TAKE TWO TABLETS BY MOUTH ONE TIME DAILY WITH BREAKFAST 180 tablet 1    atorvastatin (LIPITOR) 80 MG tablet TAKE ONE TABLET BY MOUTH ONE TIME DAILY 90 tablet 1    RYBELSUS 7 MG TABS TAKE ONE TABLET BY MOUTH EVERY MORNING BEFORE BREAKFAST 90 tablet 0    metFORMIN (GLUCOPHAGE) 1000 MG tablet TAKE ONE TABLET BY MOUTH TWICE A DAY WITH A MEAL 180 tablet 1    scopolamine (TRANSDERM-SCOP) transdermal patch Place 1 patch onto the skin every 72 hours 5 patch 1    ondansetron (ZOFRAN) 4 MG tablet Take 1 tablet by mouth 3 times daily as needed for Nausea or Vomiting 42 tablet 0    Lancets MISC Use to check blood sugar once daily, Please fill brand preferred by insurance, Dx E11.9      valsartan-hydroCHLOROthiazide (DIOVAN-HCT) 160-25 MG per tablet Take 1 tablet by mouth daily       No current facility-administered medications for this visit.         Past Medical History:   Diagnosis Date    Hyperlipemia     came off meds in  after losing 100 lbs    Hypertension     came off meds in  after losing 100 lbs    Sleep apnea     diagnosed in [facility] , repeat study after tonstilectomy showed improvement     Past Surgical History:   Procedure Laterality Date    ANTERIOR CRUCIATE LIGAMENT REPAIR Right [id number]  2002    KNEE SURGERY Right     TONSILLECTOMY Bilateral 2019    [facility], Dr Cindi Pitts     Family History   Problem Relation

## 2023-12-01 LAB — MAMMOGRAPHY, EXTERNAL: NORMAL

## 2024-01-02 NOTE — TELEPHONE ENCOUNTER
PCP: Silvano Man APRN - NP    Last appt: 9/13/2023  Future Appointments   Date Time Provider Department Center   3/13/2024  8:40 AM LAB ONLY [facility] AMB   3/15/2024  8:00 AM Silvano Man APRN - NP [facility] AMB       Requested Prescriptions     Pending Prescriptions Disp Refills    valsartan-hydroCHLOROthiazide (DIOVAN-HCT) 160-25 MG per tablet [Pharmacy Med Name: VALSARTAN-HCTZ 160-25 MG TAB[*]] 90 tablet 1     Sig: TAKE ONE TABLET BY MOUTH ONE TIME DAILY       Prior labs and Blood pressures:  BP Readings from Last 3 Encounters:   09/13/23 130/80   05/05/23 (!) 152/96   03/31/23 (!) 160/90     Lab Results   Component Value Date/Time     09/13/2023 09:58 AM    K 4.0 09/13/2023 09:58 AM     09/13/2023 09:58 AM    CO2 30 09/13/2023 09:58 AM    BUN 12 09/13/2023 09:58 AM    GFRAA >60 09/22/2022 08:46 AM     No results found for: \"HBA1C\", \"USR3CEDL\"  Lab Results   Component Value Date/Time    CHOL 149 09/13/2023 09:58 AM    HDL 41 09/13/2023 09:58 AM     No results found for: \"VITD3\", \"VD3RIA\"        Lab Results   Component Value Date/Time    TSH 3.00 06/24/2022 10:37 AM

## 2024-01-02 NOTE — TELEPHONE ENCOUNTER
Requested Prescriptions     Pending Prescriptions Disp Refills    glimepiride (AMARYL) 2 MG tablet [Pharmacy Med Name: GLIMEPIRIDE 2 MG TAB[*]] 180 tablet 1     Sig: TAKE TWO TABLETS BY MOUTH ONE TIME DAILY WITH BREAKFAST    metFORMIN (GLUCOPHAGE) 1000 MG tablet [Pharmacy Med Name: METFORMIN 1000 MG TAB] 180 tablet 1     Sig: TAKE ONE TABLET BY MOUTH TWICE A DAY WITH A MEAL    atorvastatin (LIPITOR) 80 MG tablet [Pharmacy Med Name: ATORVASTATIN 80 MG TAB] 90 tablet 1     Sig: TAKE ONE TABLET BY MOUTH ONE TIME DAILY       RX refill request from the patient/pharmacy. Patient last seen 9/13/23 with labs, and next appt. scheduled for 3/13/24.

## 2024-01-03 RX ORDER — VALSARTAN AND HYDROCHLOROTHIAZIDE 160; 25 MG/1; MG/1
1 TABLET ORAL DAILY
Qty: 90 TABLET | Refills: 1 | Status: SHIPPED | OUTPATIENT
Start: 2024-01-03

## 2024-01-03 RX ORDER — GLIMEPIRIDE 2 MG/1
TABLET ORAL
Qty: 180 TABLET | Refills: 1 | Status: SHIPPED | OUTPATIENT
Start: 2024-01-03

## 2024-01-03 RX ORDER — ATORVASTATIN CALCIUM 80 MG/1
TABLET, FILM COATED ORAL
Qty: 90 TABLET | Refills: 1 | Status: SHIPPED | OUTPATIENT
Start: 2024-01-03

## 2024-01-08 NOTE — TELEPHONE ENCOUNTER
PCP: Silvano Man APRN - NP    Last appt: 9/13/2023  Future Appointments   Date Time Provider Department Center   3/13/2024  8:40 AM LAB ONLY [facility] AMB   3/15/2024  8:00 AM Silvano Man APRN - NP [facility] AMB       Requested Prescriptions     Pending Prescriptions Disp Refills    phentermine (ADIPEX-P) 37.5 MG tablet [Pharmacy Med Name: PHENTERMINE 37.5 MG TAB] 90 tablet 0     Sig: Take 1 tablet by mouth every morning.    atorvastatin (LIPITOR) 80 MG tablet 90 tablet 1     Sig: Take 1 tablet by mouth daily       Prior labs and Blood pressures:  BP Readings from Last 3 Encounters:   09/13/23 130/80   05/05/23 (!) 152/96   03/31/23 (!) 160/90     Lab Results   Component Value Date/Time     09/13/2023 09:58 AM    K 4.0 09/13/2023 09:58 AM     09/13/2023 09:58 AM    CO2 30 09/13/2023 09:58 AM    BUN 12 09/13/2023 09:58 AM    GFRAA >60 09/22/2022 08:46 AM     No results found for: \"HBA1C\", \"[id number]\"  Lab Results   Component Value Date/Time    CHOL 149 09/13/2023 09:58 AM    HDL 41 09/13/2023 09:58 AM     No results found for: \"VITD3\", \"VD3RIA\"        Lab Results   Component Value Date/Time    TSH 3.00 06/24/2022 10:37 AM

## 2024-01-09 RX ORDER — ATORVASTATIN CALCIUM 80 MG/1
80 TABLET, FILM COATED ORAL DAILY
Qty: 90 TABLET | Refills: 1 | Status: SHIPPED | OUTPATIENT
Start: 2024-01-09

## 2024-01-09 RX ORDER — PHENTERMINE HYDROCHLORIDE 37.5 MG/1
37.5 TABLET ORAL EVERY MORNING
Qty: 90 TABLET | Refills: 0 | Status: SHIPPED | OUTPATIENT
Start: 2024-01-09 | End: 2024-04-08

## 2024-01-16 ENCOUNTER — PATIENT MESSAGE (OUTPATIENT)
Facility: CLINIC | Age: 44
End: 2024-01-16

## 2024-01-19 RX ORDER — BLOOD-GLUCOSE SENSOR
1 EACH MISCELLANEOUS DAILY
Qty: 14 EACH | Refills: 11 | Status: SHIPPED | OUTPATIENT
Start: 2024-01-19

## 2024-01-19 RX ORDER — BLOOD-GLUCOSE,RECEIVER,CONT
1 EACH MISCELLANEOUS DAILY
Qty: 1 EACH | Refills: 11 | Status: SHIPPED | OUTPATIENT
Start: 2024-01-19

## 2024-01-19 NOTE — TELEPHONE ENCOUNTER
From: Elena Doherty  To: Silvano Man  Sent: 1/16/2024 4:58 PM EST  Subject: Glucose Monitoring Device    I am interested in trying a continuous monitoring device. I would like to have a prescription written for the Freestyle Radha 3. Thanks.

## 2024-05-21 NOTE — TELEPHONE ENCOUNTER
RYBELSUS 7 MG TABS 90 tablet 1 10/25/2023 --    Sig: TAKE ONE TABLET BY MOUTH EVERY MORNING BEFORE BREAKFAST      Last visit 9/13/23 Magda  Patient on list to see Dr. Owusu    Pharmacy [facility]  [address] [place] [facility]

## 2024-05-22 RX ORDER — ORAL SEMAGLUTIDE 7 MG/1
1 TABLET ORAL
Qty: 90 TABLET | Refills: 0 | Status: SHIPPED | OUTPATIENT
Start: 2024-05-22

## 2024-05-23 RX ORDER — ORAL SEMAGLUTIDE 7 MG/1
1 TABLET ORAL
Qty: 30 TABLET | Refills: 0 | OUTPATIENT
Start: 2024-05-23

## 2024-05-23 NOTE — TELEPHONE ENCOUNTER
PCP: No primary care provider on file.    Last appt: Visit date not found    No future appointments.    Requested Prescriptions     Pending Prescriptions Disp Refills    Semaglutide (RYBELSUS) 7 MG TABS 30 tablet 0     Sig: Take 1 tablet by mouth every morning (before breakfast)

## 2024-06-06 NOTE — TELEPHONE ENCOUNTER
Pharmacy: [facility]  Patient is on the list for Owusu.      metFORMIN (GLUCOPHAGE) 1000 MG tablet [[id number]]    Order Details  Dose, Route, Frequency: As Directed   Dispense Quantity: 180 tablet Refills: 1          Sig: TAKE ONE TABLET BY MOUTH TWICE A DAY WITH A MEAL

## 2024-06-07 ENCOUNTER — NURSE ONLY (OUTPATIENT)
Facility: CLINIC | Age: 44
End: 2024-06-07

## 2024-06-07 DIAGNOSIS — E11.69 DM TYPE 2 WITH DIABETIC MIXED HYPERLIPIDEMIA (HCC): ICD-10-CM

## 2024-06-07 DIAGNOSIS — E78.2 DM TYPE 2 WITH DIABETIC MIXED HYPERLIPIDEMIA (HCC): ICD-10-CM

## 2024-06-07 DIAGNOSIS — I10 ESSENTIAL HYPERTENSION: Primary | ICD-10-CM

## 2024-06-07 LAB
ALBUMIN SERPL-MCNC: 3.9 G/DL (ref 3.5–5)
ALBUMIN/GLOB SERPL: 1.2 (ref 1.1–2.2)
ALP SERPL-CCNC: 111 U/L (ref 45–117)
ALT SERPL-CCNC: 68 U/L (ref 12–78)
ANION GAP SERPL CALC-SCNC: 8 MMOL/L (ref 5–15)
AST SERPL-CCNC: 43 U/L (ref 15–37)
BASOPHILS NFR BLD: 1 % (ref 0–1)
BUN/CREAT SERPL: 20 (ref 12–20)
CALCIUM SERPL-MCNC: 10 MG/DL (ref 8.5–10.1)
CHLORIDE SERPL-SCNC: 101 MMOL/L (ref 97–108)
CHOLEST SERPL-MCNC: 173 MG/DL
CO2 SERPL-SCNC: 26 MMOL/L (ref 21–32)
CREAT SERPL-MCNC: 0.55 MG/DL (ref 0.55–1.02)
EOSINOPHIL # BLD: 0.2 K/UL (ref 0–0.4)
EOSINOPHIL NFR BLD: 3 % (ref 0–7)
ERYTHROCYTE [DISTWIDTH] IN BLOOD BY AUTOMATED COUNT: 13 % (ref 11.5–14.5)
EST. AVERAGE GLUCOSE BLD GHB EST-MCNC: 154 MG/DL
GLOBULIN SER CALC-MCNC: 3.3 G/DL (ref 2–4)
GLUCOSE SERPL-MCNC: 205 MG/DL (ref 65–100)
HBA1C MFR BLD: 7 % (ref 4–5.6)
HCT VFR BLD AUTO: 41 % (ref 35–47)
HDLC SERPL-MCNC: 45 MG/DL
HDLC SERPL: 3.8 (ref 0–5)
HGB BLD-MCNC: 13.6 G/DL (ref 11.5–16)
IMM GRANULOCYTES # BLD AUTO: 0 K/UL (ref 0–0.04)
IMM GRANULOCYTES NFR BLD AUTO: 0 % (ref 0–0.5)
LDLC SERPL CALC-MCNC: 88 MG/DL (ref 0–100)
LYMPHOCYTES # BLD: 2.3 K/UL (ref 0.8–3.5)
LYMPHOCYTES NFR BLD: 29 % (ref 12–49)
MCH RBC QN AUTO: 28.9 PG (ref 26–34)
MCV RBC AUTO: 87.2 FL (ref 80–99)
MONOCYTES # BLD: 0.4 K/UL (ref 0–1)
MONOCYTES NFR BLD: 5 % (ref 5–13)
NEUTS SEG # BLD: 5 K/UL (ref 1.8–8)
NEUTS SEG NFR BLD: 62 % (ref 32–75)
NRBC # BLD: 0 K/UL (ref 0–0.01)
PLATELET # BLD AUTO: 358 K/UL (ref 150–400)
PMV BLD AUTO: 9 FL (ref 8.9–12.9)
POTASSIUM SERPL-SCNC: 3.9 MMOL/L (ref 3.5–5.1)
RBC # BLD AUTO: 4.7 M/UL (ref 3.8–5.2)
SODIUM SERPL-SCNC: 135 MMOL/L (ref 136–145)
TRIGL SERPL-MCNC: 200 MG/DL
VLDLC SERPL CALC-MCNC: 40 MG/DL
WBC # BLD AUTO: 7.9 K/UL (ref 3.6–11)

## 2024-06-12 RX ORDER — VALSARTAN AND HYDROCHLOROTHIAZIDE 160; 25 MG/1; MG/1
1 TABLET ORAL DAILY
Qty: 30 TABLET | Refills: 0 | Status: SHIPPED | OUTPATIENT
Start: 2024-06-12

## 2024-06-12 NOTE — TELEPHONE ENCOUNTER
RX refill request from the patient/pharmacy. Patient last seen 09- with labs, and has appointment with Renaldo Ramirez NP.  Requested Prescriptions     Pending Prescriptions Disp Refills    valsartan-hydroCHLOROthiazide (DIOVAN-HCT) 160-25 MG per tablet 30 tablet 0     Sig: Take 1 tablet by mouth daily

## 2024-06-14 ENCOUNTER — OFFICE VISIT (OUTPATIENT)
Facility: CLINIC | Age: 44
End: 2024-06-14
Payer: COMMERCIAL

## 2024-06-14 VITALS
TEMPERATURE: 98.5 F | SYSTOLIC BLOOD PRESSURE: 126 MMHG | BODY MASS INDEX: 43.85 KG/M2 | OXYGEN SATURATION: 99 % | WEIGHT: 279.4 LBS | HEART RATE: 86 BPM | DIASTOLIC BLOOD PRESSURE: 82 MMHG | HEIGHT: 67 IN | RESPIRATION RATE: 16 BRPM

## 2024-06-14 DIAGNOSIS — I10 ESSENTIAL HYPERTENSION: ICD-10-CM

## 2024-06-14 DIAGNOSIS — Z00.00 ROUTINE PHYSICAL EXAMINATION: Primary | ICD-10-CM

## 2024-06-14 DIAGNOSIS — E78.2 DM TYPE 2 WITH DIABETIC MIXED HYPERLIPIDEMIA (HCC): ICD-10-CM

## 2024-06-14 DIAGNOSIS — E66.01 CLASS 3 SEVERE OBESITY DUE TO EXCESS CALORIES WITH SERIOUS COMORBIDITY AND BODY MASS INDEX (BMI) OF 40.0 TO 44.9 IN ADULT (HCC): ICD-10-CM

## 2024-06-14 DIAGNOSIS — E11.69 DM TYPE 2 WITH DIABETIC MIXED HYPERLIPIDEMIA (HCC): ICD-10-CM

## 2024-06-14 PROBLEM — E66.813 CLASS 3 SEVERE OBESITY DUE TO EXCESS CALORIES WITH SERIOUS COMORBIDITY AND BODY MASS INDEX (BMI) OF 40.0 TO 44.9 IN ADULT: Status: ACTIVE | Noted: 2024-06-14

## 2024-06-14 PROCEDURE — 3079F DIAST BP 80-89 MM HG: CPT | Performed by: NURSE PRACTITIONER

## 2024-06-14 PROCEDURE — 99396 PREV VISIT EST AGE 40-64: CPT | Performed by: NURSE PRACTITIONER

## 2024-06-14 PROCEDURE — 3074F SYST BP LT 130 MM HG: CPT | Performed by: NURSE PRACTITIONER

## 2024-06-14 RX ORDER — ORAL SEMAGLUTIDE 14 MG/1
1 TABLET ORAL EVERY MORNING
Qty: 30 TABLET | Refills: 2 | Status: SHIPPED | OUTPATIENT
Start: 2024-06-14

## 2024-06-14 ASSESSMENT — PATIENT HEALTH QUESTIONNAIRE - PHQ9
SUM OF ALL RESPONSES TO PHQ QUESTIONS 1-9: 0
SUM OF ALL RESPONSES TO PHQ QUESTIONS 1-9: 0
SUM OF ALL RESPONSES TO PHQ9 QUESTIONS 1 & 2: 0
1. LITTLE INTEREST OR PLEASURE IN DOING THINGS: NOT AT ALL
2. FEELING DOWN, DEPRESSED OR HOPELESS: NOT AT ALL
SUM OF ALL RESPONSES TO PHQ QUESTIONS 1-9: 0
SUM OF ALL RESPONSES TO PHQ QUESTIONS 1-9: 0

## 2024-06-14 NOTE — PROGRESS NOTES
Chief Complaint   Patient presents with    Annual Exam     New to provider       SUBJECTIVE:    Elena Doherty is a 43 y.o. female who is new to me, and here today for a routine physical exam as well as follow up appointment regarding current medical conditions including: Type 2 diabetes with mixed hyperlipidemia, essential hypertension, and class III severe obesity.    The patient has been on a combination of glimepiride, metformin, and oral semaglutide for management of her type 2 diabetes.  Her last hemoglobin A1c was approximately 7.0%.  She states she is tolerating the medications well without adverse side effects.  She states she is not as watchful as she should be regarding her diet, but does exercise in the form of walking at least 6,000-10,000 steps per day on average.  However, she is frustrated with her weight and quite tearful about this.  She has a history of using phentermine, but without significant improvement.  She is not particularly restrictive with her diet presently.    She is currently on valsartan/HCTZ daily for management of her hypertension and tolerates this well.  Her blood pressure remained stable and in good control overall.  She is presently on atorvastatin daily for management of her mixed hyperlipidemia and tolerates this well.  She denies any recent episodes of chest pain, chest pressure, shortness of breath, headaches, dizziness, blurred vision, palpitations, or syncope episodes.    All medications updated in patient record.  Previous labs were reviewed prior to and during patient encounter today.    Collected Updated Procedure    06/07/2024 0953 06/07/2024 2017 CBC with Auto Differential [[id number]]   Blood from WHOLE BLOOD    Component Value Units   WBC 7.9 K/uL   RBC 4.70 M/uL   Hemoglobin 13.6 g/dL   Hematocrit 41.0 %   MCV 87.2 FL   MCH 28.9 PG   MCHC 33.2 g/dL   RDW 13.0 %   Platelets 358 K/uL   MPV 9.0 FL   Nucleated RBCs 0.0  WBC   nRBC 0.00 K/uL   Neutrophils %

## 2024-06-14 NOTE — PROGRESS NOTES
Elena Doherty is a 43 y.o. female     Chief Complaint   Patient presents with    Annual Exam     New to provider       /82 (Site: Left Upper Arm, Position: Sitting, Cuff Size: Medium Adult)   Pulse 86   Temp 98.5 °F (36.9 °C) (Oral)   Resp 16   Ht 1.702 m (5' 7\")   Wt 126.7 kg (279 lb 6.4 oz)   SpO2 99%   BMI 43.76 kg/m²     Health Maintenance Due   Topic Date Due    Hepatitis B vaccine (1 of 3 - 3-dose series) Never done    Varicella vaccine (1 of 2 - 2-dose childhood series) Never done    Pneumococcal 0-64 years Vaccine (1 of 2 - PCV) Never done    Diabetic retinal exam  Never done    DTaP/Tdap/Td vaccine (1 - Tdap) Never done    Cervical cancer screen  Never done    Breast cancer screen  Never done    COVID-19 Vaccine (4 - 2023-24 season) 09/01/2023         \"Have you been to the ER, urgent care clinic since your last visit?  Hospitalized since your last visit?\"    NO    “Have you seen or consulted any other health care providers outside of [facility] since your last visit?”    NO       Have you had a mammogram?”   YES - Where: [facility] Nurse/CMA to request most recent records if not in the chart    No breast cancer screening on file      “Have you had a pap smear?”    YES - Where: [facility] Nurse/CMA to request most recent records if not in the chart    No cervical cancer screening on file

## 2024-08-10 DIAGNOSIS — E78.2 DM TYPE 2 WITH DIABETIC MIXED HYPERLIPIDEMIA (HCC): ICD-10-CM

## 2024-08-10 DIAGNOSIS — E11.69 DM TYPE 2 WITH DIABETIC MIXED HYPERLIPIDEMIA (HCC): ICD-10-CM

## 2024-08-10 DIAGNOSIS — I10 ESSENTIAL HYPERTENSION: Primary | ICD-10-CM

## 2024-08-12 RX ORDER — ORAL SEMAGLUTIDE 14 MG/1
1 TABLET ORAL EVERY MORNING
Qty: 90 TABLET | Refills: 1 | Status: SHIPPED | OUTPATIENT
Start: 2024-08-12

## 2024-08-12 RX ORDER — VALSARTAN AND HYDROCHLOROTHIAZIDE 160; 25 MG/1; MG/1
1 TABLET ORAL DAILY
Qty: 90 TABLET | Refills: 0 | Status: SHIPPED | OUTPATIENT
Start: 2024-08-12

## 2024-08-12 NOTE — TELEPHONE ENCOUNTER
PCP: Renaldo Ramirez APRN - NP    Last appt: 6/14/2024    Future Appointments   Date Time Provider Department Center   10/29/2024  8:00 AM LAB ONLY [facility] DEP   11/1/2024 11:00 AM [facility]       Requested Prescriptions     Pending Prescriptions Disp Refills    metFORMIN (GLUCOPHAGE) 1000 MG tablet 180 tablet 1     Sig: Take 1 tablet by mouth 2 times daily (with meals)    Semaglutide (RYBELSUS) 14 MG TABS 90 tablet 1     Sig: Take 1 tablet by mouth every morning

## 2024-08-12 NOTE — TELEPHONE ENCOUNTER
RX refill request from the patient/pharmacy. Patient last seen 06- with labs, and next appt. scheduled for 11-  Requested Prescriptions     Pending Prescriptions Disp Refills    valsartan-hydroCHLOROthiazide (DIOVAN-HCT) 160-25 MG per tablet 30 tablet 2     Sig: Take 1 tablet by mouth daily    .

## 2024-10-02 DIAGNOSIS — I10 ESSENTIAL HYPERTENSION: ICD-10-CM

## 2024-10-03 RX ORDER — VALSARTAN AND HYDROCHLOROTHIAZIDE 160; 25 MG/1; MG/1
1 TABLET ORAL DAILY
Qty: 90 TABLET | Refills: 0 | Status: SHIPPED | OUTPATIENT
Start: 2024-10-03

## 2024-10-03 NOTE — TELEPHONE ENCOUNTER
PCP: Renaldo Ramirez APRN - NP    Last appt: 6/14/2024    Future Appointments   Date Time Provider Department Center   10/29/2024  8:00 AM LAB ONLY [facility] DEP   11/1/2024 11:00 AM Renaldo Ramirez APRN - NP [facility] DEP       Requested Prescriptions     Pending Prescriptions Disp Refills    valsartan-hydroCHLOROthiazide (DIOVAN-HCT) 160-25 MG per tablet [Pharmacy Med Name: VALSARTAN-HCTZ 160-25 MG TAB] 90 tablet 0     Sig: TAKE ONE TABLET BY MOUTH ONE TIME DAILY

## 2024-10-08 RX ORDER — GLIMEPIRIDE 2 MG/1
TABLET ORAL
Qty: 180 TABLET | Refills: 1 | Status: SHIPPED | OUTPATIENT
Start: 2024-10-08

## 2024-10-08 NOTE — TELEPHONE ENCOUNTER
PCP: Renaldo Ramirez APRN - NP    Last appt: 6/14/2024    Future Appointments   Date Time Provider Department Center   10/29/2024  8:00 AM LAB ONLY [facility] DEP   11/1/2024 11:00 AM Renaldo aRmirez APRN - NP [facility] DEP       Requested Prescriptions     Pending Prescriptions Disp Refills    glimepiride (AMARYL) 2 MG tablet 180 tablet 1     Sig: Take 2 tablets by mouth one time daily with breakfast.

## 2024-10-08 NOTE — TELEPHONE ENCOUNTER
Disp Refills Start End    glimepiride (AMARYL) 2 MG tablet 180 tablet 1 1/3/2024 --    Sig: TAKE TWO TABLETS BY MOUTH ONE TIME DAILY WITH BREAKFAST      Last visit 6/14/24  Future appt 11/1/24    Pharmacy S[facility]

## 2024-10-28 ENCOUNTER — PATIENT MESSAGE (OUTPATIENT)
Facility: CLINIC | Age: 44
End: 2024-10-28

## 2024-10-28 NOTE — TELEPHONE ENCOUNTER
Spoke to patient and we have rescheduled for lab appointment for 12/13/24 @ 8 am and her appointment with Pcp for 12/18/24 @ 11:30 am

## 2025-01-13 ENCOUNTER — OFFICE VISIT (OUTPATIENT)
Facility: CLINIC | Age: 45
End: 2025-01-13
Payer: COMMERCIAL

## 2025-01-13 VITALS
DIASTOLIC BLOOD PRESSURE: 82 MMHG | RESPIRATION RATE: 18 BRPM | HEIGHT: 67 IN | OXYGEN SATURATION: 95 % | HEART RATE: 77 BPM | BODY MASS INDEX: 43.85 KG/M2 | TEMPERATURE: 98.3 F | SYSTOLIC BLOOD PRESSURE: 148 MMHG | WEIGHT: 279.4 LBS

## 2025-01-13 DIAGNOSIS — E78.2 DM TYPE 2 WITH DIABETIC MIXED HYPERLIPIDEMIA (HCC): Primary | ICD-10-CM

## 2025-01-13 DIAGNOSIS — E66.813 CLASS 3 SEVERE OBESITY DUE TO EXCESS CALORIES WITH SERIOUS COMORBIDITY AND BODY MASS INDEX (BMI) OF 40.0 TO 44.9 IN ADULT: ICD-10-CM

## 2025-01-13 DIAGNOSIS — I10 ESSENTIAL HYPERTENSION: ICD-10-CM

## 2025-01-13 DIAGNOSIS — E11.69 DM TYPE 2 WITH DIABETIC MIXED HYPERLIPIDEMIA (HCC): Primary | ICD-10-CM

## 2025-01-13 DIAGNOSIS — E66.01 CLASS 3 SEVERE OBESITY DUE TO EXCESS CALORIES WITH SERIOUS COMORBIDITY AND BODY MASS INDEX (BMI) OF 40.0 TO 44.9 IN ADULT: ICD-10-CM

## 2025-01-13 PROCEDURE — 3077F SYST BP >= 140 MM HG: CPT | Performed by: NURSE PRACTITIONER

## 2025-01-13 PROCEDURE — 3079F DIAST BP 80-89 MM HG: CPT | Performed by: NURSE PRACTITIONER

## 2025-01-13 PROCEDURE — 99214 OFFICE O/P EST MOD 30 MIN: CPT | Performed by: NURSE PRACTITIONER

## 2025-01-13 RX ORDER — TIRZEPATIDE 10 MG/.5ML
10 INJECTION, SOLUTION SUBCUTANEOUS WEEKLY
Qty: 2 ML | Refills: 0 | Status: SHIPPED | OUTPATIENT
Start: 2025-01-13

## 2025-01-13 SDOH — ECONOMIC STABILITY: FOOD INSECURITY: WITHIN THE PAST 12 MONTHS, YOU WORRIED THAT YOUR FOOD WOULD RUN OUT BEFORE YOU GOT MONEY TO BUY MORE.: NEVER TRUE

## 2025-01-13 SDOH — ECONOMIC STABILITY: INCOME INSECURITY: IN THE LAST 12 MONTHS, WAS THERE A TIME WHEN YOU WERE NOT ABLE TO PAY THE MORTGAGE OR RENT ON TIME?: NO

## 2025-01-13 SDOH — ECONOMIC STABILITY: FOOD INSECURITY: WITHIN THE PAST 12 MONTHS, THE FOOD YOU BOUGHT JUST DIDN'T LAST AND YOU DIDN'T HAVE MONEY TO GET MORE.: NEVER TRUE

## 2025-01-13 SDOH — ECONOMIC STABILITY: TRANSPORTATION INSECURITY
IN THE PAST 12 MONTHS, HAS THE LACK OF TRANSPORTATION KEPT YOU FROM MEDICAL APPOINTMENTS OR FROM GETTING MEDICATIONS?: NO

## 2025-01-13 ASSESSMENT — PATIENT HEALTH QUESTIONNAIRE - PHQ9
SUM OF ALL RESPONSES TO PHQ QUESTIONS 1-9: 0
SUM OF ALL RESPONSES TO PHQ QUESTIONS 1-9: 0
1. LITTLE INTEREST OR PLEASURE IN DOING THINGS: NOT AT ALL
SUM OF ALL RESPONSES TO PHQ9 QUESTIONS 1 & 2: 0
SUM OF ALL RESPONSES TO PHQ QUESTIONS 1-9: 0
2. FEELING DOWN, DEPRESSED OR HOPELESS: NOT AT ALL
SUM OF ALL RESPONSES TO PHQ QUESTIONS 1-9: 0

## 2025-01-13 NOTE — PROGRESS NOTES
\"Have you been to the ER, urgent care clinic since your last visit?  Hospitalized since your last visit?\"    NO    “Have you seen or consulted any other health care providers outside our system since your last visit?”    NO      “Have you had a diabetic eye exam?”    NO     No diabetic eye exam on file          Chief Complaint   Patient presents with    Follow-up     4 MONTH     BP (!) 156/97 (Site: Left Upper Arm, Position: Sitting, Cuff Size: Medium Adult)   Pulse 77   Temp 98.3 °F (36.8 °C) (Oral)   Resp 18   Ht 1.702 m (5' 7\")   Wt 126.7 kg (279 lb 6.4 oz)   LMP 01/06/2025   SpO2 95%   BMI 43.76 kg/m²     
change / jaundice  Endocrine:                 hot flashes or polydipsia   Neurological:             headache, dizziness, confusion, focal weakness, paresthesia,                                      Speech difficulties, memory loss, gait difficulty  Psychological:          Feelings of anxiety, depression, agitation        Social History     Socioeconomic History    Marital status:      Spouse name: None    Number of children: None    Years of education: None    Highest education level: None   Tobacco Use    Smoking status: Never     Passive exposure: Never    Smokeless tobacco: Never   Vaping Use    Vaping status: Never Used   Substance and Sexual Activity    Alcohol use: Not Currently    Drug use: Never    Sexual activity: Yes     Social Determinants of Health     Food Insecurity: No Food Insecurity (1/13/2025)    Hunger Vital Sign     Worried About Running Out of Food in the Last Year: Never true     Ran Out of Food in the Last Year: Never true   Transportation Needs: No Transportation Needs (1/13/2025)    PRAPARE - Transportation     Lack of Transportation (Medical): No     Lack of Transportation (Non-Medical): No   Housing Stability: Low Risk  (1/13/2025)    Housing Stability Vital Sign     Unable to Pay for Housing in the Last Year: No     Number of Times Moved in the Last Year: 0     Homeless in the Last Year: No     Family History   Problem Relation Age of Onset    No Known Problems Mother     Heart Disease Father     Diabetes Paternal Grandfather     Hypertension Brother     Prostate Cancer Father        OBJECTIVE:     BP (!) 156/97 (Site: Left Upper Arm, Position: Sitting, Cuff Size: Medium Adult)   Pulse 77   Temp 98.3 °F (36.8 °C) (Oral)   Resp 18   Ht 1.702 m (5' 7\")   Wt 126.7 kg (279 lb 6.4 oz)   LMP 01/06/2025   SpO2 95%   BMI 43.76 kg/m²     Constitutional: She appears well nourished, of stated age, and dressed appropriately.  Eyes: Sclera anicteric, PERRLA, EOMI  Neck: Supple without

## 2025-01-15 LAB
ALBUMIN SERPL-MCNC: 4 G/DL (ref 3.5–5)
ALBUMIN/GLOB SERPL: 1.3 (ref 1.1–2.2)
ALP SERPL-CCNC: 86 U/L (ref 45–117)
ALT SERPL-CCNC: 87 U/L (ref 12–78)
ANION GAP SERPL CALC-SCNC: 6 MMOL/L (ref 2–12)
AST SERPL-CCNC: 47 U/L (ref 15–37)
BILIRUB SERPL-MCNC: 0.3 MG/DL (ref 0.2–1)
BUN SERPL-MCNC: 7 MG/DL (ref 6–20)
BUN/CREAT SERPL: 13 (ref 12–20)
CALCIUM SERPL-MCNC: 9.5 MG/DL (ref 8.5–10.1)
CHLORIDE SERPL-SCNC: 104 MMOL/L (ref 97–108)
CHOLEST SERPL-MCNC: 130 MG/DL
CO2 SERPL-SCNC: 26 MMOL/L (ref 21–32)
CREAT SERPL-MCNC: 0.56 MG/DL (ref 0.55–1.02)
EST. AVERAGE GLUCOSE BLD GHB EST-MCNC: 146 MG/DL
GLOBULIN SER CALC-MCNC: 3.1 G/DL (ref 2–4)
GLUCOSE SERPL-MCNC: 90 MG/DL (ref 65–100)
HBA1C MFR BLD: 6.7 % (ref 4–5.6)
HDLC SERPL-MCNC: 39 MG/DL
HDLC SERPL: 3.3 (ref 0–5)
LDLC SERPL CALC-MCNC: 65.4 MG/DL (ref 0–100)
POTASSIUM SERPL-SCNC: 3.6 MMOL/L (ref 3.5–5.1)
PROT SERPL-MCNC: 7.1 G/DL (ref 6.4–8.2)
SODIUM SERPL-SCNC: 136 MMOL/L (ref 136–145)
TRIGL SERPL-MCNC: 128 MG/DL
TSH SERPL DL<=0.05 MIU/L-ACNC: 2.21 UIU/ML (ref 0.36–3.74)
VLDLC SERPL CALC-MCNC: 25.6 MG/DL

## 2025-02-01 ENCOUNTER — PATIENT MESSAGE (OUTPATIENT)
Facility: CLINIC | Age: 45
End: 2025-02-01

## 2025-02-01 DIAGNOSIS — E78.2 DM TYPE 2 WITH DIABETIC MIXED HYPERLIPIDEMIA (HCC): Primary | ICD-10-CM

## 2025-02-01 DIAGNOSIS — I10 ESSENTIAL HYPERTENSION: ICD-10-CM

## 2025-02-01 DIAGNOSIS — E78.2 MIXED HYPERLIPIDEMIA: ICD-10-CM

## 2025-02-01 DIAGNOSIS — E11.69 DM TYPE 2 WITH DIABETIC MIXED HYPERLIPIDEMIA (HCC): Primary | ICD-10-CM

## 2025-02-03 RX ORDER — VALSARTAN AND HYDROCHLOROTHIAZIDE 160; 25 MG/1; MG/1
1 TABLET ORAL DAILY
Qty: 90 TABLET | Refills: 1 | Status: SHIPPED | OUTPATIENT
Start: 2025-02-03 | End: 2025-02-03 | Stop reason: SDUPTHER

## 2025-02-03 RX ORDER — ATORVASTATIN CALCIUM 80 MG/1
80 TABLET, FILM COATED ORAL DAILY
Qty: 90 TABLET | Refills: 1 | Status: SHIPPED | OUTPATIENT
Start: 2025-02-03 | End: 2025-02-05 | Stop reason: SDUPTHER

## 2025-02-03 RX ORDER — VALSARTAN AND HYDROCHLOROTHIAZIDE 160; 25 MG/1; MG/1
1 TABLET ORAL DAILY
Qty: 90 TABLET | Refills: 1 | Status: SHIPPED | OUTPATIENT
Start: 2025-02-03 | End: 2025-02-05 | Stop reason: SDUPTHER

## 2025-02-03 NOTE — TELEPHONE ENCOUNTER
PCP: Renaldo Ramirez APRN - NP    Last appt: 1/13/2025    Future Appointments   Date Time Provider Department Center   5/8/2025  8:00 AM LAB ONLY [facility] DEP   5/15/2025 10:30 AM Renaldo Ramirez APRN - NP [facility] DEP       Requested Prescriptions     Pending Prescriptions Disp Refills    valsartan-hydroCHLOROthiazide (DIOVAN-HCT) 160-25 MG per tablet 90 tablet 0     Sig: Take 1 tablet by mouth daily

## 2025-02-04 RX ORDER — TIRZEPATIDE 15 MG/.5ML
15 INJECTION, SOLUTION SUBCUTANEOUS WEEKLY
Qty: 6 ML | Refills: 1 | Status: SHIPPED | OUTPATIENT
Start: 2025-02-04 | End: 2025-02-04

## 2025-02-04 RX ORDER — TIRZEPATIDE 15 MG/.5ML
15 INJECTION, SOLUTION SUBCUTANEOUS WEEKLY
Qty: 6 ML | Refills: 1 | Status: SHIPPED | OUTPATIENT
Start: 2025-02-04 | End: 2025-02-04 | Stop reason: SDUPTHER

## 2025-02-04 RX ORDER — TIRZEPATIDE 15 MG/.5ML
15 INJECTION, SOLUTION SUBCUTANEOUS WEEKLY
Qty: 6 ML | Refills: 1 | Status: SHIPPED | OUTPATIENT
Start: 2025-02-04 | End: 2025-02-05 | Stop reason: SDUPTHER

## 2025-02-05 DIAGNOSIS — I10 ESSENTIAL HYPERTENSION: ICD-10-CM

## 2025-02-05 DIAGNOSIS — E78.2 MIXED HYPERLIPIDEMIA: ICD-10-CM

## 2025-02-05 DIAGNOSIS — E11.69 DM TYPE 2 WITH DIABETIC MIXED HYPERLIPIDEMIA (HCC): ICD-10-CM

## 2025-02-05 DIAGNOSIS — E78.2 DM TYPE 2 WITH DIABETIC MIXED HYPERLIPIDEMIA (HCC): ICD-10-CM

## 2025-02-05 RX ORDER — TIRZEPATIDE 15 MG/.5ML
15 INJECTION, SOLUTION SUBCUTANEOUS WEEKLY
Qty: 6 ML | Refills: 1 | Status: SHIPPED | OUTPATIENT
Start: 2025-02-05 | End: 2025-02-05 | Stop reason: SDUPTHER

## 2025-02-05 RX ORDER — VALSARTAN AND HYDROCHLOROTHIAZIDE 160; 25 MG/1; MG/1
1 TABLET ORAL DAILY
Qty: 90 TABLET | Refills: 1 | Status: SHIPPED | OUTPATIENT
Start: 2025-02-05

## 2025-02-05 RX ORDER — TIRZEPATIDE 15 MG/.5ML
15 INJECTION, SOLUTION SUBCUTANEOUS WEEKLY
Qty: 2 ML | Refills: 5 | Status: SHIPPED | OUTPATIENT
Start: 2025-02-05

## 2025-02-05 RX ORDER — ATORVASTATIN CALCIUM 80 MG/1
80 TABLET, FILM COATED ORAL DAILY
Qty: 90 TABLET | Refills: 1 | Status: SHIPPED | OUTPATIENT
Start: 2025-02-05

## 2025-04-07 ENCOUNTER — PATIENT MESSAGE (OUTPATIENT)
Facility: CLINIC | Age: 45
End: 2025-04-07

## 2025-04-07 DIAGNOSIS — E11.69 DM TYPE 2 WITH DIABETIC MIXED HYPERLIPIDEMIA (HCC): Primary | ICD-10-CM

## 2025-04-07 DIAGNOSIS — E78.2 DM TYPE 2 WITH DIABETIC MIXED HYPERLIPIDEMIA (HCC): Primary | ICD-10-CM

## 2025-04-18 DIAGNOSIS — E78.2 DM TYPE 2 WITH DIABETIC MIXED HYPERLIPIDEMIA (HCC): ICD-10-CM

## 2025-04-18 DIAGNOSIS — E11.69 DM TYPE 2 WITH DIABETIC MIXED HYPERLIPIDEMIA (HCC): ICD-10-CM

## 2025-04-18 NOTE — TELEPHONE ENCOUNTER
PCP: Renaldo Ramirez APRN - NP    Last appt: 1/13/2025    Future Appointments   Date Time Provider Department Center   5/8/2025  8:[facility]   5/15/2025 10:30 AM Renaldo Ramirez APRN - NP [facility] DEP       Requested Prescriptions     Pending Prescriptions Disp Refills    metFORMIN (GLUCOPHAGE) 1000 MG tablet [Pharmacy Med Name: METFORMIN HCL 1,000 MG TABLET] 180 tablet 1     Sig: TAKE 1 TABLET BY MOUTH TWICE A DAY WITH MEALS

## 2025-05-08 ENCOUNTER — LAB (OUTPATIENT)
Facility: CLINIC | Age: 45
End: 2025-05-08

## 2025-05-08 DIAGNOSIS — E78.2 DM TYPE 2 WITH DIABETIC MIXED HYPERLIPIDEMIA (HCC): Primary | ICD-10-CM

## 2025-05-08 DIAGNOSIS — I10 ESSENTIAL HYPERTENSION: ICD-10-CM

## 2025-05-08 DIAGNOSIS — E11.69 DM TYPE 2 WITH DIABETIC MIXED HYPERLIPIDEMIA (HCC): Primary | ICD-10-CM

## 2025-05-08 LAB
ALBUMIN SERPL-MCNC: 3.8 G/DL (ref 3.5–5)
ALBUMIN/GLOB SERPL: 1.4 (ref 1.1–2.2)
ALP SERPL-CCNC: 89 U/L (ref 45–117)
ALT SERPL-CCNC: 57 U/L (ref 12–78)
ANION GAP SERPL CALC-SCNC: 7 MMOL/L (ref 2–12)
AST SERPL-CCNC: 28 U/L (ref 15–37)
BASOPHILS # BLD: 0.05 K/UL (ref 0–0.1)
BASOPHILS NFR BLD: 0.7 % (ref 0–1)
BILIRUB SERPL-MCNC: 0.4 MG/DL (ref 0.2–1)
BUN SERPL-MCNC: 13 MG/DL (ref 6–20)
BUN/CREAT SERPL: 24 (ref 12–20)
CALCIUM SERPL-MCNC: 10 MG/DL (ref 8.5–10.1)
CHLORIDE SERPL-SCNC: 106 MMOL/L (ref 97–108)
CHOLEST SERPL-MCNC: 135 MG/DL
CO2 SERPL-SCNC: 26 MMOL/L (ref 21–32)
CREAT SERPL-MCNC: 0.54 MG/DL (ref 0.55–1.02)
DIFFERENTIAL METHOD BLD: NORMAL
EOSINOPHIL # BLD: 0.21 K/UL (ref 0–0.4)
EOSINOPHIL NFR BLD: 3 % (ref 0–7)
ERYTHROCYTE [DISTWIDTH] IN BLOOD BY AUTOMATED COUNT: 12.9 % (ref 11.5–14.5)
EST. AVERAGE GLUCOSE BLD GHB EST-MCNC: 131 MG/DL
GLOBULIN SER CALC-MCNC: 2.7 G/DL (ref 2–4)
GLUCOSE SERPL-MCNC: 117 MG/DL (ref 65–100)
HBA1C MFR BLD: 6.2 % (ref 4–5.6)
HCT VFR BLD AUTO: 40.4 % (ref 35–47)
HDLC SERPL-MCNC: 42 MG/DL
HDLC SERPL: 3.2 (ref 0–5)
HGB BLD-MCNC: 13.6 G/DL (ref 11.5–16)
IMM GRANULOCYTES # BLD AUTO: 0.03 K/UL (ref 0–0.04)
IMM GRANULOCYTES NFR BLD AUTO: 0.4 % (ref 0–0.5)
LDLC SERPL CALC-MCNC: 59.2 MG/DL (ref 0–100)
LYMPHOCYTES # BLD: 2.29 K/UL (ref 0.8–3.5)
LYMPHOCYTES NFR BLD: 32.3 % (ref 12–49)
MCH RBC QN AUTO: 29.8 PG (ref 26–34)
MCHC RBC AUTO-ENTMCNC: 33.7 G/DL (ref 30–36.5)
MCV RBC AUTO: 88.6 FL (ref 80–99)
MONOCYTES # BLD: 0.45 K/UL (ref 0–1)
MONOCYTES NFR BLD: 6.4 % (ref 5–13)
NEUTS SEG # BLD: 4.05 K/UL (ref 1.8–8)
NEUTS SEG NFR BLD: 57.2 % (ref 32–75)
NRBC # BLD: 0 K/UL (ref 0–0.01)
NRBC BLD-RTO: 0 PER 100 WBC
PLATELET # BLD AUTO: 345 K/UL (ref 150–400)
PMV BLD AUTO: 9.1 FL (ref 8.9–12.9)
POTASSIUM SERPL-SCNC: 4.3 MMOL/L (ref 3.5–5.1)
PROT SERPL-MCNC: 6.5 G/DL (ref 6.4–8.2)
RBC # BLD AUTO: 4.56 M/UL (ref 3.8–5.2)
SODIUM SERPL-SCNC: 139 MMOL/L (ref 136–145)
TRIGL SERPL-MCNC: 169 MG/DL
VLDLC SERPL CALC-MCNC: 33.8 MG/DL
WBC # BLD AUTO: 7.1 K/UL (ref 3.6–11)

## 2025-05-09 ENCOUNTER — RESULTS FOLLOW-UP (OUTPATIENT)
Facility: CLINIC | Age: 45
End: 2025-05-09

## 2025-05-12 DIAGNOSIS — E11.69 DM TYPE 2 WITH DIABETIC MIXED HYPERLIPIDEMIA (HCC): Primary | ICD-10-CM

## 2025-05-12 DIAGNOSIS — E78.2 DM TYPE 2 WITH DIABETIC MIXED HYPERLIPIDEMIA (HCC): Primary | ICD-10-CM

## 2025-05-12 DIAGNOSIS — I10 ESSENTIAL HYPERTENSION: ICD-10-CM

## 2025-06-24 DIAGNOSIS — E78.2 DM TYPE 2 WITH DIABETIC MIXED HYPERLIPIDEMIA (HCC): ICD-10-CM

## 2025-06-24 DIAGNOSIS — E11.69 DM TYPE 2 WITH DIABETIC MIXED HYPERLIPIDEMIA (HCC): ICD-10-CM

## 2025-06-24 RX ORDER — TIRZEPATIDE 15 MG/.5ML
15 INJECTION, SOLUTION SUBCUTANEOUS WEEKLY
Qty: 2 ML | Refills: 5 | Status: SHIPPED | OUTPATIENT
Start: 2025-06-24

## 2025-06-24 NOTE — TELEPHONE ENCOUNTER
PCP: Renaldo Ramirez APRN - NP    Last appt: 1/13/2025    Future Appointments   Date Time Provider Department Center   8/12/2025  8:00 AM LAB ONLY [facility] DEP   8/15/2025  1:00 PM Renaldo Ramirez APRN - NP [facility] DEP       Requested Prescriptions     Pending Prescriptions Disp Refills    Tirzepatide (MOUNJARO) 15 MG/0.5ML SOAJ pen [Pharmacy Med Name: MOUNJARO 15 MG/0.5 ML PEN] 2 mL 5     Sig: INJECT 15 MG INTO THE SKIN ONCE A WEEK

## 2025-08-02 DIAGNOSIS — E78.2 MIXED HYPERLIPIDEMIA: ICD-10-CM

## 2025-08-02 DIAGNOSIS — I10 ESSENTIAL HYPERTENSION: ICD-10-CM

## 2025-08-04 RX ORDER — ATORVASTATIN CALCIUM 80 MG/1
80 TABLET, FILM COATED ORAL DAILY
Qty: 90 TABLET | Refills: 1 | Status: SHIPPED | OUTPATIENT
Start: 2025-08-04

## 2025-08-04 RX ORDER — VALSARTAN AND HYDROCHLOROTHIAZIDE 160; 25 MG/1; MG/1
1 TABLET ORAL DAILY
Qty: 90 TABLET | Refills: 1 | Status: SHIPPED | OUTPATIENT
Start: 2025-08-04

## 2025-08-12 ENCOUNTER — LAB (OUTPATIENT)
Facility: CLINIC | Age: 45
End: 2025-08-12

## 2025-08-12 DIAGNOSIS — E78.2 DM TYPE 2 WITH DIABETIC MIXED HYPERLIPIDEMIA (HCC): ICD-10-CM

## 2025-08-12 DIAGNOSIS — E11.69 DM TYPE 2 WITH DIABETIC MIXED HYPERLIPIDEMIA (HCC): ICD-10-CM

## 2025-08-12 DIAGNOSIS — I10 ESSENTIAL HYPERTENSION: ICD-10-CM

## 2025-08-13 LAB
ALBUMIN SERPL-MCNC: 4.3 G/DL (ref 3.5–5.2)
ALBUMIN/GLOB SERPL: 1.7 (ref 1.1–2.2)
ALP SERPL-CCNC: 89 U/L (ref 35–104)
ALT SERPL-CCNC: 53 U/L (ref 10–35)
ANION GAP SERPL CALC-SCNC: 13 MMOL/L (ref 2–14)
AST SERPL-CCNC: 29 U/L (ref 10–35)
BILIRUB SERPL-MCNC: 0.3 MG/DL (ref 0–1.2)
BUN SERPL-MCNC: 19 MG/DL (ref 6–20)
BUN/CREAT SERPL: 34 (ref 12–20)
CALCIUM SERPL-MCNC: 10.4 MG/DL (ref 8.6–10)
CHLORIDE SERPL-SCNC: 102 MMOL/L (ref 98–107)
CHOLEST SERPL-MCNC: 143 MG/DL (ref 0–200)
CO2 SERPL-SCNC: 22 MMOL/L (ref 20–29)
CREAT SERPL-MCNC: 0.55 MG/DL (ref 0.6–1)
CREAT UR-MCNC: 167 MG/DL (ref 28–217)
EST. AVERAGE GLUCOSE BLD GHB EST-MCNC: 123 MG/DL
GLOBULIN SER CALC-MCNC: 2.5 G/DL (ref 2–4)
GLUCOSE SERPL-MCNC: 121 MG/DL (ref 65–100)
HBA1C MFR BLD: 5.9 % (ref 4–5.6)
HDLC SERPL-MCNC: 32 MG/DL (ref 40–60)
HDLC SERPL: 4.5 (ref 0–5)
LDLC SERPL CALC-MCNC: 34 MG/DL (ref 0–100)
MICROALBUMIN UR-MCNC: 9.41 MG/DL
MICROALBUMIN/CREAT UR-RTO: 56 MG/G
POTASSIUM SERPL-SCNC: 4.5 MMOL/L (ref 3.5–5.1)
PROT SERPL-MCNC: 6.8 G/DL (ref 6.4–8.3)
SODIUM SERPL-SCNC: 137 MMOL/L (ref 136–145)
TRIGL SERPL-MCNC: 385 MG/DL (ref 0–150)
VLDLC SERPL CALC-MCNC: 77 MG/DL

## 2025-08-15 ENCOUNTER — OFFICE VISIT (OUTPATIENT)
Facility: CLINIC | Age: 45
End: 2025-08-15
Payer: COMMERCIAL

## 2025-08-15 VITALS
BODY MASS INDEX: 40.59 KG/M2 | SYSTOLIC BLOOD PRESSURE: 128 MMHG | RESPIRATION RATE: 16 BRPM | HEART RATE: 72 BPM | OXYGEN SATURATION: 98 % | TEMPERATURE: 98.7 F | DIASTOLIC BLOOD PRESSURE: 76 MMHG | HEIGHT: 67 IN | WEIGHT: 258.6 LBS

## 2025-08-15 DIAGNOSIS — I10 ESSENTIAL HYPERTENSION: ICD-10-CM

## 2025-08-15 DIAGNOSIS — G47.33 OSA ON CPAP: ICD-10-CM

## 2025-08-15 DIAGNOSIS — E66.813 CLASS 3 SEVERE OBESITY DUE TO EXCESS CALORIES WITH SERIOUS COMORBIDITY AND BODY MASS INDEX (BMI) OF 40.0 TO 44.9 IN ADULT (HCC): ICD-10-CM

## 2025-08-15 DIAGNOSIS — E78.2 DM TYPE 2 WITH DIABETIC MIXED HYPERLIPIDEMIA (HCC): ICD-10-CM

## 2025-08-15 DIAGNOSIS — R11.0 NAUSEA: ICD-10-CM

## 2025-08-15 DIAGNOSIS — T75.3XXD SEVERE MOTION SICKNESS, SUBSEQUENT ENCOUNTER: ICD-10-CM

## 2025-08-15 DIAGNOSIS — Z00.00 ROUTINE PHYSICAL EXAMINATION: Primary | ICD-10-CM

## 2025-08-15 DIAGNOSIS — E11.69 DM TYPE 2 WITH DIABETIC MIXED HYPERLIPIDEMIA (HCC): ICD-10-CM

## 2025-08-15 PROCEDURE — 3074F SYST BP LT 130 MM HG: CPT | Performed by: NURSE PRACTITIONER

## 2025-08-15 PROCEDURE — 99396 PREV VISIT EST AGE 40-64: CPT | Performed by: NURSE PRACTITIONER

## 2025-08-15 PROCEDURE — 3078F DIAST BP <80 MM HG: CPT | Performed by: NURSE PRACTITIONER

## 2025-08-15 RX ORDER — ROSUVASTATIN CALCIUM 40 MG/1
40 TABLET, COATED ORAL DAILY
Qty: 90 TABLET | Refills: 1 | Status: SHIPPED | OUTPATIENT
Start: 2025-08-15

## 2025-08-15 RX ORDER — ONDANSETRON 8 MG/1
8 TABLET, ORALLY DISINTEGRATING ORAL EVERY 8 HOURS PRN
Qty: 30 TABLET | Refills: 2 | Status: SHIPPED | OUTPATIENT
Start: 2025-08-15

## 2025-08-15 RX ORDER — ONDANSETRON 4 MG/1
4 TABLET, FILM COATED ORAL EVERY 8 HOURS PRN
Qty: 42 TABLET | Refills: 0 | Status: CANCELLED | OUTPATIENT
Start: 2025-08-15

## 2025-08-15 RX ORDER — SCOPOLAMINE 1 MG/3D
1 PATCH, EXTENDED RELEASE TRANSDERMAL
Qty: 10 PATCH | Refills: 0 | Status: SHIPPED | OUTPATIENT
Start: 2025-08-15